# Patient Record
Sex: FEMALE | Race: WHITE | Employment: FULL TIME | URBAN - METROPOLITAN AREA
[De-identification: names, ages, dates, MRNs, and addresses within clinical notes are randomized per-mention and may not be internally consistent; named-entity substitution may affect disease eponyms.]

---

## 2018-05-19 ENCOUNTER — HOSPITAL ENCOUNTER (OUTPATIENT)
Age: 63
Discharge: HOME | End: 2018-05-19
Payer: COMMERCIAL

## 2018-05-19 LAB
ALBUMIN SERPL-MCNC: 4.1 G/DL (ref 3.5–5.2)
ALBUMIN/GLOBULIN RATIO: NORMAL (ref 1–2.5)
ALP BLD-CCNC: 70 U/L (ref 35–104)
ALT SERPL-CCNC: 17 U/L (ref 5–33)
AST SERPL-CCNC: 20 U/L
BILIRUB SERPL-MCNC: 0.56 MG/DL (ref 0.3–1.2)
BILIRUBIN DIRECT: 0.13 MG/DL
BILIRUBIN, INDIRECT: 0.43 MG/DL (ref 0–1)
GLOBULIN: NORMAL G/DL (ref 1.5–3.8)
HCT VFR BLD CALC: 39.1 % (ref 36–46)
HEMOGLOBIN: 12.8 G/DL (ref 12–16)
MCH RBC QN AUTO: 30 PG (ref 26–34)
MCHC RBC AUTO-ENTMCNC: 32.7 G/DL (ref 31–37)
MCV RBC AUTO: 91.7 FL (ref 80–100)
NRBC AUTOMATED: NORMAL PER 100 WBC
PDW BLD-RTO: 13.1 % (ref 11.5–14.5)
PLATELET # BLD: 251 K/UL (ref 130–400)
PMV BLD AUTO: 8.5 FL (ref 6–12)
RBC # BLD: 4.26 M/UL (ref 4–5.2)
TOTAL PROTEIN: 6.9 G/DL (ref 6.4–8.3)
WBC # BLD: 6.5 K/UL (ref 3.5–11)

## 2018-05-19 PROCEDURE — 86480 TB TEST CELL IMMUN MEASURE: CPT

## 2018-05-19 PROCEDURE — 36415 COLL VENOUS BLD VENIPUNCTURE: CPT

## 2018-05-19 PROCEDURE — 80076 HEPATIC FUNCTION PANEL: CPT

## 2018-05-19 PROCEDURE — 85027 COMPLETE CBC AUTOMATED: CPT

## 2018-05-21 LAB
QUANTIFERON (R) TB GOLD (INCUBATED): NEGATIVE
QUANTIFERON MITOGEN: >10 IU/ML
QUANTIFERON NIL: 0.03 IU/ML
QUANTIFERON TB AG MINUS NIL: 0 IU/ML (ref 0–0.34)

## 2019-12-05 ENCOUNTER — HOSPITAL ENCOUNTER (OUTPATIENT)
Dept: GENERAL RADIOLOGY | Age: 64
Discharge: HOME | End: 2019-12-07
Payer: MEDICARE

## 2019-12-05 ENCOUNTER — HOSPITAL ENCOUNTER (OUTPATIENT)
Age: 64
Discharge: HOME | End: 2019-12-07
Payer: MEDICARE

## 2019-12-05 ENCOUNTER — HOSPITAL ENCOUNTER (OUTPATIENT)
Age: 64
Discharge: HOME | End: 2019-12-05
Payer: MEDICARE

## 2019-12-05 DIAGNOSIS — M25.532 ACUTE PAIN OF LEFT WRIST: ICD-10-CM

## 2019-12-05 LAB
ABSOLUTE EOS #: 0.79 K/UL (ref 0–0.44)
ABSOLUTE IMMATURE GRANULOCYTE: <0.03 K/UL (ref 0–0.3)
ABSOLUTE LYMPH #: 2.91 K/UL (ref 1.1–3.7)
ABSOLUTE MONO #: 0.8 K/UL (ref 0.1–1.2)
ALBUMIN SERPL-MCNC: 4 G/DL (ref 3.5–5.2)
ALBUMIN SERPL-MCNC: 4.1 G/DL (ref 3.5–5.2)
ALBUMIN/GLOBULIN RATIO: 1.3 (ref 1–2.5)
ALBUMIN/GLOBULIN RATIO: 1.3 (ref 1–2.5)
ALP BLD-CCNC: 77 U/L (ref 35–104)
ALP BLD-CCNC: 78 U/L (ref 35–104)
ALT SERPL-CCNC: 17 U/L (ref 5–33)
ALT SERPL-CCNC: 17 U/L (ref 5–33)
ANION GAP SERPL CALCULATED.3IONS-SCNC: 13 MMOL/L (ref 9–17)
ANION GAP SERPL CALCULATED.3IONS-SCNC: 16 MMOL/L (ref 9–17)
AST SERPL-CCNC: 19 U/L
AST SERPL-CCNC: 20 U/L
BASOPHILS # BLD: 1 % (ref 0–2)
BASOPHILS ABSOLUTE: 0.11 K/UL (ref 0–0.2)
BILIRUB SERPL-MCNC: 0.29 MG/DL (ref 0.3–1.2)
BILIRUB SERPL-MCNC: 0.3 MG/DL (ref 0.3–1.2)
BUN BLDV-MCNC: 20 MG/DL (ref 8–23)
BUN BLDV-MCNC: 21 MG/DL (ref 8–23)
BUN/CREAT BLD: ABNORMAL (ref 9–20)
BUN/CREAT BLD: ABNORMAL (ref 9–20)
CALCIUM SERPL-MCNC: 9 MG/DL (ref 8.6–10.4)
CALCIUM SERPL-MCNC: 9.1 MG/DL (ref 8.6–10.4)
CHLORIDE BLD-SCNC: 105 MMOL/L (ref 98–107)
CHLORIDE BLD-SCNC: 107 MMOL/L (ref 98–107)
CHOLESTEROL, FASTING: 196 MG/DL
CHOLESTEROL/HDL RATIO: 2.8
CO2: 21 MMOL/L (ref 20–31)
CO2: 23 MMOL/L (ref 20–31)
CREAT SERPL-MCNC: 0.58 MG/DL (ref 0.5–0.9)
CREAT SERPL-MCNC: 0.63 MG/DL (ref 0.5–0.9)
DIFFERENTIAL TYPE: ABNORMAL
EOSINOPHILS RELATIVE PERCENT: 9 % (ref 1–4)
GFR AFRICAN AMERICAN: >60 ML/MIN
GFR AFRICAN AMERICAN: >60 ML/MIN
GFR NON-AFRICAN AMERICAN: >60 ML/MIN
GFR NON-AFRICAN AMERICAN: >60 ML/MIN
GFR SERPL CREATININE-BSD FRML MDRD: ABNORMAL ML/MIN/{1.73_M2}
GLUCOSE BLD-MCNC: 115 MG/DL (ref 70–99)
GLUCOSE BLD-MCNC: 116 MG/DL (ref 70–99)
HCT VFR BLD CALC: 39.3 % (ref 36.3–47.1)
HDLC SERPL-MCNC: 71 MG/DL
HEMOGLOBIN: 12.6 G/DL (ref 11.9–15.1)
IMMATURE GRANULOCYTES: 0 %
LDL CHOLESTEROL: 109 MG/DL (ref 0–130)
LYMPHOCYTES # BLD: 35 % (ref 24–43)
MCH RBC QN AUTO: 30.1 PG (ref 25.2–33.5)
MCHC RBC AUTO-ENTMCNC: 32.1 G/DL (ref 28.4–34.8)
MCV RBC AUTO: 93.8 FL (ref 82.6–102.9)
MONOCYTES # BLD: 10 % (ref 3–12)
NRBC AUTOMATED: 0 PER 100 WBC
PDW BLD-RTO: 12.3 % (ref 11.8–14.4)
PLATELET # BLD: 280 K/UL (ref 138–453)
PLATELET ESTIMATE: ABNORMAL
PMV BLD AUTO: 11.1 FL (ref 8.1–13.5)
POTASSIUM SERPL-SCNC: 3.9 MMOL/L (ref 3.7–5.3)
POTASSIUM SERPL-SCNC: 4 MMOL/L (ref 3.7–5.3)
RBC # BLD: 4.19 M/UL (ref 3.95–5.11)
RBC # BLD: ABNORMAL 10*6/UL
SEG NEUTROPHILS: 45 % (ref 36–65)
SEGMENTED NEUTROPHILS ABSOLUTE COUNT: 3.8 K/UL (ref 1.5–8.1)
SODIUM BLD-SCNC: 141 MMOL/L (ref 135–144)
SODIUM BLD-SCNC: 144 MMOL/L (ref 135–144)
TOTAL PROTEIN: 7.2 G/DL (ref 6.4–8.3)
TOTAL PROTEIN: 7.2 G/DL (ref 6.4–8.3)
TRIGLYCERIDE, FASTING: 80 MG/DL
VLDLC SERPL CALC-MCNC: NORMAL MG/DL (ref 1–30)
WBC # BLD: 8.4 K/UL (ref 3.5–11.3)
WBC # BLD: ABNORMAL 10*3/UL

## 2019-12-05 PROCEDURE — 85025 COMPLETE CBC W/AUTO DIFF WBC: CPT

## 2019-12-05 PROCEDURE — 80061 LIPID PANEL: CPT

## 2019-12-05 PROCEDURE — 36415 COLL VENOUS BLD VENIPUNCTURE: CPT

## 2019-12-05 PROCEDURE — 86480 TB TEST CELL IMMUN MEASURE: CPT

## 2019-12-05 PROCEDURE — 80053 COMPREHEN METABOLIC PANEL: CPT

## 2019-12-05 PROCEDURE — 73110 X-RAY EXAM OF WRIST: CPT

## 2019-12-08 LAB
QUANTI TB GOLD PLUS: NEGATIVE
QUANTI TB1 MINUS NIL: 0.01 IU/ML (ref 0–0.34)
QUANTI TB2 MINUS NIL: 0.02 IU/ML (ref 0–0.34)
QUANTIFERON MITOGEN: 7.02 IU/ML
QUANTIFERON NIL: 0.02 IU/ML

## 2020-11-30 ENCOUNTER — APPOINTMENT (OUTPATIENT)
Dept: CT IMAGING | Age: 65
End: 2020-11-30
Payer: MEDICARE

## 2020-11-30 ENCOUNTER — HOSPITAL ENCOUNTER (EMERGENCY)
Age: 65
Discharge: HOME | End: 2020-11-30
Attending: EMERGENCY MEDICINE
Payer: MEDICARE

## 2020-11-30 VITALS
RESPIRATION RATE: 16 BRPM | DIASTOLIC BLOOD PRESSURE: 69 MMHG | BODY MASS INDEX: 29.31 KG/M2 | WEIGHT: 175.9 LBS | TEMPERATURE: 98.2 F | HEART RATE: 77 BPM | OXYGEN SATURATION: 99 % | SYSTOLIC BLOOD PRESSURE: 144 MMHG | HEIGHT: 65 IN

## 2020-11-30 LAB
ABSOLUTE EOS #: 0.32 K/UL (ref 0–0.44)
ABSOLUTE IMMATURE GRANULOCYTE: 0.03 K/UL (ref 0–0.3)
ABSOLUTE LYMPH #: 2.33 K/UL (ref 1.1–3.7)
ABSOLUTE MONO #: 0.94 K/UL (ref 0.1–1.2)
ALBUMIN SERPL-MCNC: 4.6 G/DL (ref 3.5–5.2)
ALBUMIN/GLOBULIN RATIO: ABNORMAL (ref 1–2.5)
ALP BLD-CCNC: 128 U/L (ref 35–104)
ALT SERPL-CCNC: 29 U/L (ref 5–33)
AMYLASE: 58 U/L (ref 28–100)
ANION GAP SERPL CALCULATED.3IONS-SCNC: 13 MMOL/L (ref 9–17)
AST SERPL-CCNC: 25 U/L
BASOPHILS # BLD: 1 % (ref 0–2)
BASOPHILS ABSOLUTE: 0.07 K/UL (ref 0–0.2)
BILIRUB SERPL-MCNC: 0.36 MG/DL (ref 0.3–1.2)
BILIRUBIN DIRECT: 0.13 MG/DL
BILIRUBIN, INDIRECT: 0.23 MG/DL (ref 0–1)
BUN BLDV-MCNC: 10 MG/DL (ref 8–23)
BUN/CREAT BLD: 13 (ref 9–20)
CALCIUM SERPL-MCNC: 9.4 MG/DL (ref 8.6–10.4)
CHLORIDE BLD-SCNC: 96 MMOL/L (ref 98–107)
CO2: 24 MMOL/L (ref 20–31)
CREAT SERPL-MCNC: 0.78 MG/DL (ref 0.5–0.9)
DIFFERENTIAL TYPE: NORMAL
EOSINOPHILS RELATIVE PERCENT: 4 % (ref 1–4)
GFR AFRICAN AMERICAN: >60 ML/MIN
GFR NON-AFRICAN AMERICAN: >60 ML/MIN
GFR SERPL CREATININE-BSD FRML MDRD: ABNORMAL ML/MIN/{1.73_M2}
GFR SERPL CREATININE-BSD FRML MDRD: ABNORMAL ML/MIN/{1.73_M2}
GLOBULIN: ABNORMAL G/DL (ref 1.5–3.8)
GLUCOSE BLD-MCNC: 101 MG/DL (ref 70–99)
HCT VFR BLD CALC: 40 % (ref 36.3–47.1)
HEMOGLOBIN: 12.8 G/DL (ref 11.9–15.1)
IMMATURE GRANULOCYTES: 0 %
LIPASE: 32 U/L (ref 13–60)
LYMPHOCYTES # BLD: 30 % (ref 24–43)
MCH RBC QN AUTO: 29.8 PG (ref 25.2–33.5)
MCHC RBC AUTO-ENTMCNC: 32 G/DL (ref 28.4–34.8)
MCV RBC AUTO: 93.2 FL (ref 82.6–102.9)
MONOCYTES # BLD: 12 % (ref 3–12)
NRBC AUTOMATED: 0 PER 100 WBC
PDW BLD-RTO: 12 % (ref 11.8–14.4)
PLATELET # BLD: 351 K/UL (ref 138–453)
PLATELET ESTIMATE: NORMAL
PMV BLD AUTO: 9.6 FL (ref 8.1–13.5)
POTASSIUM SERPL-SCNC: 4 MMOL/L (ref 3.7–5.3)
RBC # BLD: 4.29 M/UL (ref 3.95–5.11)
RBC # BLD: NORMAL 10*6/UL
SEG NEUTROPHILS: 53 % (ref 36–65)
SEGMENTED NEUTROPHILS ABSOLUTE COUNT: 4.06 K/UL (ref 1.5–8.1)
SODIUM BLD-SCNC: 133 MMOL/L (ref 135–144)
TOTAL PROTEIN: 8.1 G/DL (ref 6.4–8.3)
WBC # BLD: 7.8 K/UL (ref 3.5–11.3)
WBC # BLD: NORMAL 10*3/UL

## 2020-11-30 PROCEDURE — 83690 ASSAY OF LIPASE: CPT

## 2020-11-30 PROCEDURE — 80076 HEPATIC FUNCTION PANEL: CPT

## 2020-11-30 PROCEDURE — 6360000004 HC RX CONTRAST MEDICATION: Performed by: EMERGENCY MEDICINE

## 2020-11-30 PROCEDURE — 85025 COMPLETE CBC W/AUTO DIFF WBC: CPT

## 2020-11-30 PROCEDURE — 74177 CT ABD & PELVIS W/CONTRAST: CPT

## 2020-11-30 PROCEDURE — 80048 BASIC METABOLIC PNL TOTAL CA: CPT

## 2020-11-30 PROCEDURE — 82150 ASSAY OF AMYLASE: CPT

## 2020-11-30 PROCEDURE — 99282 EMERGENCY DEPT VISIT SF MDM: CPT

## 2020-11-30 PROCEDURE — 2580000003 HC RX 258: Performed by: EMERGENCY MEDICINE

## 2020-11-30 RX ORDER — LISINOPRIL 20 MG/1
20 TABLET ORAL DAILY
COMMUNITY

## 2020-11-30 RX ORDER — HYDROCODONE BITARTRATE AND ACETAMINOPHEN 5; 325 MG/1; MG/1
1 TABLET ORAL EVERY 4 HOURS PRN
Qty: 10 TABLET | Refills: 0 | Status: SHIPPED | OUTPATIENT
Start: 2020-11-30 | End: 2020-12-05

## 2020-11-30 RX ORDER — 0.9 % SODIUM CHLORIDE 0.9 %
80 INTRAVENOUS SOLUTION INTRAVENOUS ONCE
Status: COMPLETED | OUTPATIENT
Start: 2020-11-30 | End: 2020-11-30

## 2020-11-30 RX ORDER — SULFAMETHOXAZOLE AND TRIMETHOPRIM 800; 160 MG/1; MG/1
TABLET ORAL
COMMUNITY
End: 2021-02-09 | Stop reason: ALTCHOICE

## 2020-11-30 RX ORDER — ONDANSETRON 4 MG/1
4 TABLET, ORALLY DISINTEGRATING ORAL EVERY 8 HOURS PRN
Qty: 12 TABLET | Refills: 0 | Status: ON HOLD | OUTPATIENT
Start: 2020-11-30 | End: 2021-03-02

## 2020-11-30 RX ORDER — SODIUM CHLORIDE 0.9 % (FLUSH) 0.9 %
10 SYRINGE (ML) INJECTION ONCE
Status: COMPLETED | OUTPATIENT
Start: 2020-11-30 | End: 2020-11-30

## 2020-11-30 RX ADMIN — SODIUM CHLORIDE 80 ML: 9 INJECTION, SOLUTION INTRAVENOUS at 19:51

## 2020-11-30 RX ADMIN — Medication 10 ML: at 19:51

## 2020-11-30 RX ADMIN — IOPAMIDOL 75 ML: 755 INJECTION, SOLUTION INTRAVENOUS at 19:51

## 2020-11-30 SDOH — HEALTH STABILITY: MENTAL HEALTH: HOW OFTEN DO YOU HAVE A DRINK CONTAINING ALCOHOL?: NEVER

## 2020-11-30 ASSESSMENT — ENCOUNTER SYMPTOMS
NAUSEA: 1
ABDOMINAL PAIN: 1

## 2020-11-30 ASSESSMENT — PAIN SCALES - GENERAL: PAINLEVEL_OUTOF10: 3

## 2020-12-01 NOTE — ED NOTES
Assessment complaining of generalized abdominal pain that started over a week ago. It has gotten better but now states it is tender. Denies nausea vomiting or any changes in stool patterns. Abdomen softly obese tender to palpation she is in no acute distress.      Obdulio Rios RN  11/30/20 2228

## 2020-12-01 NOTE — ED NOTES
[facility] called, Dr. Michaela Johnston states final report pt would need outpatient follow up with pelvic ultrasound for cystic changes in uterus. Dr. Francisca Yen d/c pt prior to [facility] calling with an edit with the report. Report written, and will be followed up per protocol.      Huy Ang RN  11/30/20 9601

## 2020-12-10 ENCOUNTER — HOSPITAL ENCOUNTER (OUTPATIENT)
Dept: ULTRASOUND IMAGING | Age: 65
Discharge: HOME | End: 2020-12-12
Payer: MEDICARE

## 2021-02-09 ENCOUNTER — HOSPITAL ENCOUNTER (OUTPATIENT)
Dept: PREADMISSION TESTING | Age: 66
Discharge: HOME | End: 2021-02-13
Payer: MEDICARE

## 2021-02-09 VITALS
SYSTOLIC BLOOD PRESSURE: 168 MMHG | HEART RATE: 69 BPM | TEMPERATURE: 96.5 F | RESPIRATION RATE: 14 BRPM | DIASTOLIC BLOOD PRESSURE: 86 MMHG | OXYGEN SATURATION: 99 % | BODY MASS INDEX: 28.95 KG/M2 | HEIGHT: 64 IN | WEIGHT: 169.6 LBS

## 2021-02-09 LAB
ANION GAP SERPL CALCULATED.3IONS-SCNC: 9 MMOL/L (ref 9–17)
BUN BLDV-MCNC: 15 MG/DL (ref 8–23)
CHLORIDE BLD-SCNC: 102 MMOL/L (ref 98–107)
CO2: 27 MMOL/L (ref 20–31)
CREAT SERPL-MCNC: 0.61 MG/DL (ref 0.5–0.9)
GFR AFRICAN AMERICAN: >60 ML/MIN
GFR NON-AFRICAN AMERICAN: >60 ML/MIN
GFR SERPL CREATININE-BSD FRML MDRD: NORMAL ML/MIN/{1.73_M2}
GFR SERPL CREATININE-BSD FRML MDRD: NORMAL ML/MIN/{1.73_M2}
HCT VFR BLD CALC: 42 % (ref 36.3–47.1)
HEMOGLOBIN: 13.5 G/DL (ref 11.9–15.1)
MCH RBC QN AUTO: 29.5 PG (ref 25.2–33.5)
MCHC RBC AUTO-ENTMCNC: 32.1 G/DL (ref 28.4–34.8)
MCV RBC AUTO: 91.9 FL (ref 82.6–102.9)
NRBC AUTOMATED: 0 PER 100 WBC
PDW BLD-RTO: 12.7 % (ref 11.8–14.4)
PLATELET # BLD: 274 K/UL (ref 138–453)
PMV BLD AUTO: 10 FL (ref 8.1–13.5)
POTASSIUM SERPL-SCNC: 4.2 MMOL/L (ref 3.7–5.3)
RBC # BLD: 4.57 M/UL (ref 3.95–5.11)
SODIUM BLD-SCNC: 138 MMOL/L (ref 135–144)
WBC # BLD: 8.2 K/UL (ref 3.5–11.3)

## 2021-02-09 PROCEDURE — 85027 COMPLETE CBC AUTOMATED: CPT

## 2021-02-09 PROCEDURE — 84520 ASSAY OF UREA NITROGEN: CPT

## 2021-02-09 PROCEDURE — 93005 ELECTROCARDIOGRAM TRACING: CPT | Performed by: ANESTHESIOLOGY

## 2021-02-09 PROCEDURE — 80051 ELECTROLYTE PANEL: CPT

## 2021-02-09 PROCEDURE — 82565 ASSAY OF CREATININE: CPT

## 2021-02-09 PROCEDURE — 36415 COLL VENOUS BLD VENIPUNCTURE: CPT

## 2021-02-09 RX ORDER — ACETAMINOPHEN 500 MG
500 TABLET ORAL PRN
COMMUNITY

## 2021-02-09 RX ORDER — OMEPRAZOLE 20 MG/1
20 CAPSULE, DELAYED RELEASE ORAL DAILY
COMMUNITY

## 2021-02-09 NOTE — PRE-PROCEDURE INSTRUCTIONS
ARRIVE AT [facility] ON Tuesday, 3/2/21 at 8:15 AM    On the Day of Surgery: Drive up to the main entrance and remain in your vehicle. Call [phone] from your vehicle for additional instructions. No family members or other visitors will be able to enter the building with you. Continue to take your home medications as you normally do up to and including the night before surgery with the exception of any blood thinning medications. Please stop any blood thinning medications as directed by your surgeon or prescribing physician. Failure to stop certain medications may interfere with your scheduled surgery. These may include:  Aspirin, Warfarin (Coumadin), Clopidogrel (Plavix), Ibuprofen (Motrin, Advil), Naproxen (Aleve), Meloxicam (Mobic), Celecoxib (Celebrex), Eliquis, Pradaxa, Xarelto, Effient, Fish Oil, Herbal supplements. Please take the following medication(s) the day of surgery with a small sip of water:  Omeprazole      PREPARING FOR YOUR SURGERY:     Before surgery, you can play an important role in your own health. Because skin is not sterile, we need to be sure that your skin is as free of germs as possible before surgery by carefully washing before surgery. Preparing or prepping skin before surgery can reduce the risk of a surgical site infection.   Do not shave the area of your body where your surgery will be performed unless you received specific permission from your physician. You will need to shower at home the night before surgery and the morning of surgery with a special soap called chlorhexidine gluconate (CHG*). *Not to be used by people allergic to Chlorhexidine Gluconate (CHG). Following these instructions will help you be sure that your skin is clean before surgery. Instructions on cleaning your skin before surgery: The night before your surgery:      You will need to shower with warm water (not hot) and the CHG soap.        Use a clean wash cloth and a any jewelry or body piercings day of surgery. Also, NO lotion, perfume or deodorant to be used the day of surgery. No nail polish on the operative extremity (arm/leg surgeries)    · If you are staying overnight with us, please bring a small bag of necessary personal items.  Please wear loose, comfortable clothing. If you are potentially going to have a cast or brace bring clothing that will fit over them.  In case of illness - If you have cold or flu like symptoms (high fever, runny nose, sore throat, cough, etc.) rash, nausea, vomiting, loose stools, and/or recent contact with someone who has a contagious disease (chicken pox, measles, etc.) Please call your doctor before coming to the hospital.         Day of Surgery/Procedure:    As a patient at [facility] you can expect quality medical and nursing care that is centered on your individual needs. Our goal is to make your surgical experience as comfortable as possible    . Transportation After Your Surgery/Procedure: You will need a friend or family member to drive you home after your procedure. Your  must be 25years of age or older. Discharge instructions will be reviewed with family/friend by phone. A taxi cab or any other form of public transportation is not acceptable. Someone must remain with you for the first 24 hours after your surgery if you receive anesthesia or medication. If you do not have someone to stay with you, your procedure may be cancelled.       If you have any other questions regarding your procedure or the day of surgery, please call [phone]    COVID test on 2/26/21 @ 2:20 pm      _________________________  ____________________________  Signature (Patient)    Signature (Provider)            Date

## 2021-02-10 LAB
EKG ATRIAL RATE: 66 BPM
EKG P AXIS: 17 DEGREES
EKG P-R INTERVAL: 182 MS
EKG Q-T INTERVAL: 398 MS
EKG QRS DURATION: 86 MS
EKG QTC CALCULATION (BAZETT): 417 MS
EKG R AXIS: -7 DEGREES
EKG T AXIS: 32 DEGREES
EKG VENTRICULAR RATE: 66 BPM

## 2021-02-20 ENCOUNTER — HOSPITAL ENCOUNTER (OUTPATIENT)
Age: 66
Discharge: HOME | End: 2021-02-20
Payer: MEDICARE

## 2021-02-20 LAB
ABSOLUTE EOS #: 0.6 K/UL (ref 0–0.44)
ABSOLUTE IMMATURE GRANULOCYTE: <0.03 K/UL (ref 0–0.3)
ABSOLUTE LYMPH #: 2.22 K/UL (ref 1.1–3.7)
ABSOLUTE MONO #: 0.66 K/UL (ref 0.1–1.2)
ALBUMIN SERPL-MCNC: 4.3 G/DL (ref 3.5–5.2)
ALBUMIN/GLOBULIN RATIO: 1.3 (ref 1–2.5)
ALP BLD-CCNC: 82 U/L (ref 35–104)
ALT SERPL-CCNC: 16 U/L (ref 5–33)
ANION GAP SERPL CALCULATED.3IONS-SCNC: 10 MMOL/L (ref 9–17)
AST SERPL-CCNC: 19 U/L
BASOPHILS # BLD: 2 % (ref 0–2)
BASOPHILS ABSOLUTE: 0.15 K/UL (ref 0–0.2)
BILIRUB SERPL-MCNC: 0.79 MG/DL (ref 0.3–1.2)
BUN BLDV-MCNC: 13 MG/DL (ref 8–23)
BUN/CREAT BLD: ABNORMAL (ref 9–20)
CALCIUM SERPL-MCNC: 9.5 MG/DL (ref 8.6–10.4)
CHLORIDE BLD-SCNC: 103 MMOL/L (ref 98–107)
CO2: 25 MMOL/L (ref 20–31)
CREAT SERPL-MCNC: 0.7 MG/DL (ref 0.5–0.9)
DIFFERENTIAL TYPE: ABNORMAL
EOSINOPHILS RELATIVE PERCENT: 9 % (ref 1–4)
GFR AFRICAN AMERICAN: >60 ML/MIN
GFR NON-AFRICAN AMERICAN: >60 ML/MIN
GFR SERPL CREATININE-BSD FRML MDRD: ABNORMAL ML/MIN/{1.73_M2}
GFR SERPL CREATININE-BSD FRML MDRD: ABNORMAL ML/MIN/{1.73_M2}
GLUCOSE BLD-MCNC: 107 MG/DL (ref 70–99)
HCT VFR BLD CALC: 42.2 % (ref 36.3–47.1)
HEMOGLOBIN: 13.3 G/DL (ref 11.9–15.1)
IMMATURE GRANULOCYTES: 0 %
LYMPHOCYTES # BLD: 34 % (ref 24–43)
MCH RBC QN AUTO: 29.4 PG (ref 25.2–33.5)
MCHC RBC AUTO-ENTMCNC: 31.5 G/DL (ref 28.4–34.8)
MCV RBC AUTO: 93.2 FL (ref 82.6–102.9)
MONOCYTES # BLD: 10 % (ref 3–12)
NRBC AUTOMATED: 0 PER 100 WBC
PDW BLD-RTO: 12.7 % (ref 11.8–14.4)
PLATELET # BLD: 284 K/UL (ref 138–453)
PLATELET ESTIMATE: ABNORMAL
PMV BLD AUTO: 11.3 FL (ref 8.1–13.5)
POTASSIUM SERPL-SCNC: 4.2 MMOL/L (ref 3.7–5.3)
PTH INTACT: 62.97 PG/ML (ref 15–65)
RBC # BLD: 4.53 M/UL (ref 3.95–5.11)
RBC # BLD: ABNORMAL 10*6/UL
SEG NEUTROPHILS: 45 % (ref 36–65)
SEGMENTED NEUTROPHILS ABSOLUTE COUNT: 2.89 K/UL (ref 1.5–8.1)
SODIUM BLD-SCNC: 138 MMOL/L (ref 135–144)
THYROXINE, FREE: 0.86 NG/DL (ref 0.93–1.7)
TOTAL PROTEIN: 7.6 G/DL (ref 6.4–8.3)
TSH SERPL DL<=0.05 MIU/L-ACNC: 2.33 MIU/L (ref 0.3–5)
VITAMIN D 25-HYDROXY: 20 NG/ML (ref 30–100)
WBC # BLD: 6.5 K/UL (ref 3.5–11.3)
WBC # BLD: ABNORMAL 10*3/UL

## 2021-02-20 PROCEDURE — 84443 ASSAY THYROID STIM HORMONE: CPT

## 2021-02-20 PROCEDURE — 83970 ASSAY OF PARATHORMONE: CPT

## 2021-02-20 PROCEDURE — 36415 COLL VENOUS BLD VENIPUNCTURE: CPT

## 2021-02-20 PROCEDURE — 82306 VITAMIN D 25 HYDROXY: CPT

## 2021-02-20 PROCEDURE — 84439 ASSAY OF FREE THYROXINE: CPT

## 2021-02-20 PROCEDURE — 85025 COMPLETE CBC W/AUTO DIFF WBC: CPT

## 2021-02-20 PROCEDURE — 80053 COMPREHEN METABOLIC PANEL: CPT

## 2021-02-26 ENCOUNTER — HOSPITAL ENCOUNTER (OUTPATIENT)
Dept: LAB | Age: 66
Setting detail: SPECIMEN
Discharge: HOME | End: 2021-02-26
Payer: MEDICARE

## 2021-02-26 DIAGNOSIS — Z01.818 PREOP TESTING: Primary | ICD-10-CM

## 2021-02-26 PROCEDURE — U0003 INFECTIOUS AGENT DETECTION BY NUCLEIC ACID (DNA OR RNA); SEVERE ACUTE RESPIRATORY SYNDROME CORONAVIRUS 2 (SARS-COV-2) (CORONAVIRUS DISEASE [COVID-19]), AMPLIFIED PROBE TECHNIQUE, MAKING USE OF HIGH THROUGHPUT TECHNOLOGIES AS DESCRIBED BY CMS-2020-01-R: HCPCS

## 2021-02-26 PROCEDURE — U0005 INFEC AGEN DETEC AMPLI PROBE: HCPCS

## 2021-02-27 LAB
SARS-COV-2: NORMAL
SARS-COV-2: NOT DETECTED
SOURCE: NORMAL

## 2021-03-02 ENCOUNTER — HOSPITAL ENCOUNTER (OUTPATIENT)
Age: 66
Setting detail: OUTPATIENT SURGERY
Discharge: HOME | End: 2021-03-02
Attending: SURGERY | Admitting: SURGERY
Payer: MEDICARE

## 2021-03-02 ENCOUNTER — ANESTHESIA (OUTPATIENT)
Dept: OPERATING ROOM | Age: 66
End: 2021-03-02
Payer: MEDICARE

## 2021-03-02 ENCOUNTER — ANESTHESIA EVENT (OUTPATIENT)
Dept: OPERATING ROOM | Age: 66
End: 2021-03-02
Payer: MEDICARE

## 2021-03-02 VITALS
SYSTOLIC BLOOD PRESSURE: 159 MMHG | RESPIRATION RATE: 18 BRPM | TEMPERATURE: 98.2 F | HEIGHT: 64 IN | WEIGHT: 169.53 LBS | HEART RATE: 79 BPM | OXYGEN SATURATION: 94 % | DIASTOLIC BLOOD PRESSURE: 85 MMHG | BODY MASS INDEX: 28.94 KG/M2

## 2021-03-02 VITALS — TEMPERATURE: 83.7 F | DIASTOLIC BLOOD PRESSURE: 67 MMHG | SYSTOLIC BLOOD PRESSURE: 144 MMHG | OXYGEN SATURATION: 100 %

## 2021-03-02 DIAGNOSIS — Z87.19 HISTORY OF INCISIONAL HERNIA REPAIR: Primary | ICD-10-CM

## 2021-03-02 DIAGNOSIS — Z98.890 HISTORY OF INCISIONAL HERNIA REPAIR: Primary | ICD-10-CM

## 2021-03-02 PROCEDURE — 3600000009 HC SURGERY ROBOT BASE: Performed by: SURGERY

## 2021-03-02 PROCEDURE — 3600000019 HC SURGERY ROBOT ADDTL 15MIN: Performed by: SURGERY

## 2021-03-02 PROCEDURE — 7100000010 HC PHASE II RECOVERY - FIRST 15 MIN: Performed by: SURGERY

## 2021-03-02 PROCEDURE — 6360000002 HC RX W HCPCS: Performed by: NURSE ANESTHETIST, CERTIFIED REGISTERED

## 2021-03-02 PROCEDURE — 2720000010 HC SURG SUPPLY STERILE: Performed by: SURGERY

## 2021-03-02 PROCEDURE — 7100000001 HC PACU RECOVERY - ADDTL 15 MIN: Performed by: SURGERY

## 2021-03-02 PROCEDURE — 6360000002 HC RX W HCPCS: Performed by: ANESTHESIOLOGY

## 2021-03-02 PROCEDURE — 6360000002 HC RX W HCPCS: Performed by: SURGERY

## 2021-03-02 PROCEDURE — 7100000000 HC PACU RECOVERY - FIRST 15 MIN: Performed by: SURGERY

## 2021-03-02 PROCEDURE — 7100000011 HC PHASE II RECOVERY - ADDTL 15 MIN: Performed by: SURGERY

## 2021-03-02 PROCEDURE — 2580000003 HC RX 258: Performed by: ANESTHESIOLOGY

## 2021-03-02 PROCEDURE — 2500000003 HC RX 250 WO HCPCS: Performed by: NURSE ANESTHETIST, CERTIFIED REGISTERED

## 2021-03-02 PROCEDURE — 2500000003 HC RX 250 WO HCPCS: Performed by: SURGERY

## 2021-03-02 PROCEDURE — C1781 MESH (IMPLANTABLE): HCPCS | Performed by: SURGERY

## 2021-03-02 PROCEDURE — 3700000000 HC ANESTHESIA ATTENDED CARE: Performed by: SURGERY

## 2021-03-02 PROCEDURE — S2900 ROBOTIC SURGICAL SYSTEM: HCPCS | Performed by: SURGERY

## 2021-03-02 PROCEDURE — 3700000001 HC ADD 15 MINUTES (ANESTHESIA): Performed by: SURGERY

## 2021-03-02 PROCEDURE — 2709999900 HC NON-CHARGEABLE SUPPLY: Performed by: SURGERY

## 2021-03-02 DEVICE — MESH SURG W3.5XL6IN POLY SELF FIXATING RECT W/ RESRB PLA: Type: IMPLANTABLE DEVICE | Site: GROIN | Status: FUNCTIONAL

## 2021-03-02 RX ORDER — FENTANYL CITRATE 50 UG/ML
25 INJECTION, SOLUTION INTRAMUSCULAR; INTRAVENOUS EVERY 5 MIN PRN
Status: DISCONTINUED | OUTPATIENT
Start: 2021-03-02 | End: 2021-03-02 | Stop reason: HOSPADM

## 2021-03-02 RX ORDER — DEXAMETHASONE SODIUM PHOSPHATE 10 MG/ML
INJECTION INTRAMUSCULAR; INTRAVENOUS PRN
Status: DISCONTINUED | OUTPATIENT
Start: 2021-03-02 | End: 2021-03-02 | Stop reason: SDUPTHER

## 2021-03-02 RX ORDER — LIDOCAINE HYDROCHLORIDE 20 MG/ML
INJECTION, SOLUTION INFILTRATION; PERINEURAL PRN
Status: DISCONTINUED | OUTPATIENT
Start: 2021-03-02 | End: 2021-03-02 | Stop reason: SDUPTHER

## 2021-03-02 RX ORDER — ONDANSETRON 2 MG/ML
4 INJECTION INTRAMUSCULAR; INTRAVENOUS
Status: DISCONTINUED | OUTPATIENT
Start: 2021-03-02 | End: 2021-03-02 | Stop reason: HOSPADM

## 2021-03-02 RX ORDER — ROCURONIUM BROMIDE 10 MG/ML
INJECTION, SOLUTION INTRAVENOUS PRN
Status: DISCONTINUED | OUTPATIENT
Start: 2021-03-02 | End: 2021-03-02 | Stop reason: SDUPTHER

## 2021-03-02 RX ORDER — ONDANSETRON 2 MG/ML
INJECTION INTRAMUSCULAR; INTRAVENOUS PRN
Status: DISCONTINUED | OUTPATIENT
Start: 2021-03-02 | End: 2021-03-02 | Stop reason: SDUPTHER

## 2021-03-02 RX ORDER — OXYCODONE HYDROCHLORIDE AND ACETAMINOPHEN 5; 325 MG/1; MG/1
1 TABLET ORAL EVERY 6 HOURS PRN
Qty: 28 TABLET | Refills: 0 | Status: SHIPPED | OUTPATIENT
Start: 2021-03-02 | End: 2021-03-09

## 2021-03-02 RX ORDER — DOCUSATE SODIUM 100 MG/1
100 CAPSULE, LIQUID FILLED ORAL 2 TIMES DAILY PRN
Qty: 60 CAPSULE | Refills: 0 | Status: SHIPPED | OUTPATIENT
Start: 2021-03-02 | End: 2021-04-01

## 2021-03-02 RX ORDER — HYDROMORPHONE HCL 110MG/55ML
0.25 PATIENT CONTROLLED ANALGESIA SYRINGE INTRAVENOUS EVERY 5 MIN PRN
Status: DISCONTINUED | OUTPATIENT
Start: 2021-03-02 | End: 2021-03-02 | Stop reason: HOSPADM

## 2021-03-02 RX ORDER — ONDANSETRON 4 MG/1
4 TABLET, ORALLY DISINTEGRATING ORAL EVERY 8 HOURS PRN
Qty: 20 TABLET | Refills: 0 | Status: SHIPPED | OUTPATIENT
Start: 2021-03-02

## 2021-03-02 RX ORDER — PROPOFOL 10 MG/ML
INJECTION, EMULSION INTRAVENOUS PRN
Status: DISCONTINUED | OUTPATIENT
Start: 2021-03-02 | End: 2021-03-02 | Stop reason: SDUPTHER

## 2021-03-02 RX ORDER — KETOROLAC TROMETHAMINE 30 MG/ML
INJECTION, SOLUTION INTRAMUSCULAR; INTRAVENOUS PRN
Status: DISCONTINUED | OUTPATIENT
Start: 2021-03-02 | End: 2021-03-02 | Stop reason: SDUPTHER

## 2021-03-02 RX ORDER — FENTANYL CITRATE 50 UG/ML
INJECTION, SOLUTION INTRAMUSCULAR; INTRAVENOUS PRN
Status: DISCONTINUED | OUTPATIENT
Start: 2021-03-02 | End: 2021-03-02 | Stop reason: SDUPTHER

## 2021-03-02 RX ORDER — METHOCARBAMOL 500 MG/1
500 TABLET, FILM COATED ORAL 4 TIMES DAILY
Qty: 28 TABLET | Refills: 0 | Status: SHIPPED | OUTPATIENT
Start: 2021-03-02 | End: 2021-03-09

## 2021-03-02 RX ORDER — SODIUM CHLORIDE, SODIUM LACTATE, POTASSIUM CHLORIDE, CALCIUM CHLORIDE 600; 310; 30; 20 MG/100ML; MG/100ML; MG/100ML; MG/100ML
INJECTION, SOLUTION INTRAVENOUS CONTINUOUS
Status: DISCONTINUED | OUTPATIENT
Start: 2021-03-03 | End: 2021-03-02 | Stop reason: HOSPADM

## 2021-03-02 RX ORDER — LIDOCAINE HYDROCHLORIDE 10 MG/ML
1 INJECTION, SOLUTION EPIDURAL; INFILTRATION; INTRACAUDAL; PERINEURAL
Status: DISCONTINUED | OUTPATIENT
Start: 2021-03-03 | End: 2021-03-02 | Stop reason: HOSPADM

## 2021-03-02 RX ADMIN — Medication 50 MCG: at 09:48

## 2021-03-02 RX ADMIN — Medication 25 MCG: at 10:05

## 2021-03-02 RX ADMIN — SODIUM CHLORIDE, POTASSIUM CHLORIDE, SODIUM LACTATE AND CALCIUM CHLORIDE: 600; 310; 30; 20 INJECTION, SOLUTION INTRAVENOUS at 09:21

## 2021-03-02 RX ADMIN — FENTANYL CITRATE 25 MCG: 50 INJECTION, SOLUTION INTRAMUSCULAR; INTRAVENOUS at 11:33

## 2021-03-02 RX ADMIN — SODIUM CHLORIDE, POTASSIUM CHLORIDE, SODIUM LACTATE AND CALCIUM CHLORIDE: 600; 310; 30; 20 INJECTION, SOLUTION INTRAVENOUS at 08:49

## 2021-03-02 RX ADMIN — ROCURONIUM BROMIDE 50 MG: 10 INJECTION, SOLUTION INTRAVENOUS at 09:26

## 2021-03-02 RX ADMIN — Medication 50 MCG: at 09:56

## 2021-03-02 RX ADMIN — FENTANYL CITRATE 25 MCG: 50 INJECTION, SOLUTION INTRAMUSCULAR; INTRAVENOUS at 11:10

## 2021-03-02 RX ADMIN — ONDANSETRON 4 MG: 2 INJECTION, SOLUTION INTRAMUSCULAR; INTRAVENOUS at 09:35

## 2021-03-02 RX ADMIN — CEFAZOLIN 2000 MG: 10 INJECTION, POWDER, FOR SOLUTION INTRAVENOUS at 09:33

## 2021-03-02 RX ADMIN — Medication 50 MCG: at 09:26

## 2021-03-02 RX ADMIN — KETOROLAC TROMETHAMINE 30 MG: 30 INJECTION, SOLUTION INTRAMUSCULAR; INTRAVENOUS at 10:34

## 2021-03-02 RX ADMIN — DEXAMETHASONE SODIUM PHOSPHATE 10 MG: 10 INJECTION INTRAMUSCULAR; INTRAVENOUS at 09:35

## 2021-03-02 RX ADMIN — LIDOCAINE HYDROCHLORIDE 100 MG: 20 INJECTION, SOLUTION INFILTRATION; PERINEURAL at 09:26

## 2021-03-02 RX ADMIN — SUGAMMADEX 200 MG: 100 INJECTION, SOLUTION INTRAVENOUS at 10:43

## 2021-03-02 RX ADMIN — PROPOFOL 140 MG: 10 INJECTION, EMULSION INTRAVENOUS at 09:26

## 2021-03-02 RX ADMIN — Medication 25 MCG: at 10:08

## 2021-03-02 ASSESSMENT — PULMONARY FUNCTION TESTS
PIF_VALUE: 18
PIF_VALUE: 21
PIF_VALUE: 3
PIF_VALUE: 30
PIF_VALUE: 29
PIF_VALUE: 33
PIF_VALUE: 33
PIF_VALUE: 21
PIF_VALUE: 33
PIF_VALUE: 33
PIF_VALUE: 0
PIF_VALUE: 21
PIF_VALUE: 21
PIF_VALUE: 31
PIF_VALUE: 31
PIF_VALUE: 15
PIF_VALUE: 33
PIF_VALUE: 2
PIF_VALUE: 33
PIF_VALUE: 13
PIF_VALUE: 33
PIF_VALUE: 1
PIF_VALUE: 33
PIF_VALUE: 4
PIF_VALUE: 33
PIF_VALUE: 33
PIF_VALUE: 34
PIF_VALUE: 29
PIF_VALUE: 15
PIF_VALUE: 33
PIF_VALUE: 33
PIF_VALUE: 29
PIF_VALUE: 29
PIF_VALUE: 11
PIF_VALUE: 33
PIF_VALUE: 33
PIF_VALUE: 21
PIF_VALUE: 6
PIF_VALUE: 33
PIF_VALUE: 33
PIF_VALUE: 13
PIF_VALUE: 30
PIF_VALUE: 15
PIF_VALUE: 29
PIF_VALUE: 20
PIF_VALUE: 33
PIF_VALUE: 26
PIF_VALUE: 22
PIF_VALUE: 33

## 2021-03-02 ASSESSMENT — PAIN DESCRIPTION - DESCRIPTORS
DESCRIPTORS: ACHING;SORE
DESCRIPTORS: ACHING;SORE

## 2021-03-02 ASSESSMENT — PAIN SCALES - GENERAL: PAINLEVEL_OUTOF10: 5

## 2021-03-02 NOTE — ANESTHESIA PRE PROCEDURE
Department of Anesthesiology  Preprocedure Note       Name:  Amy Snow   Age:  76 y.o.  :  1955                                          MRN:  [id number]         Date:  3/2/2021      Surgeon: Cayden Deal):  Georgette Santoyo MD    Procedure: Procedure(s):  INCISIONAL HERNIA REPAIR LAPAROSCOPIC ROBOTIC XI AND LEFT INGUINAL HERNIA REPAIR    Medications prior to admission:   Prior to Admission medications    Medication Sig Start Date End Date Taking? Authorizing Provider   oxyCODONE-acetaminophen (PERCOCET) 5-325 MG per tablet Take 1 tablet by mouth every 6 hours as needed for Pain for up to 7 days. Intended supply: 7 days.  Take lowest dose possible to manage pain 3/2/21 3/9/21 Yes Zenaida No, DO   docusate sodium (COLACE) 100 MG capsule Take 1 capsule by mouth 2 times daily as needed for Constipation 3/2/21 4/1/21 Yes Zenaida No, DO   ondansetron (ZOFRAN ODT) 4 MG disintegrating tablet Take 1 tablet by mouth every 8 hours as needed for Nausea or Vomiting 3/2/21  Yes Zenaida No, DO   methocarbamol (ROBAXIN) 500 MG tablet Take 1 tablet by mouth 4 times daily for 7 days 3/2/21 3/9/21 Yes Zenaida No, DO   omeprazole (PRILOSEC) 20 MG delayed release capsule Take 20 mg by mouth daily   Yes Historical Provider, MD GARCIA PO Take 5 mLs by mouth daily   Yes Historical Provider, MD   Adalimumab (HUMIRA PEN SC) Inject into the skin every 14 days    Yes Historical Provider, MD   lisinopril (PRINIVIL;ZESTRIL) 20 MG tablet Take 20 mg by mouth daily    Yes Historical Provider, MD   acetaminophen (TYLENOL) 500 MG tablet Take 500 mg by mouth as needed for Pain (headache)    Historical Provider, MD       Current medications:    Current Facility-Administered Medications   Medication Dose Route Frequency Provider Last Rate Last Admin    [START ON 3/3/2021] lactated ringers infusion   Intravenous Continuous Cassandra Burgess MD        [START ON 3/3/2021] lidocaine PF 1 % injection 1 mL  1 mL Intradermal Once PRN Alan Kline MD        ceFAZolin (ANCEF) 2000 mg in dextrose 5 % 50 mL IVPB  2,000 mg Intravenous Once Talat Hope MD           Allergies: Allergies   Allergen Reactions    Macrobid [Nitrofurantoin] Rash       Problem List:  There is no problem list on file for this patient. Past Medical History:        Diagnosis Date    Acid reflux     History of acute pancreatitis 2020    resolved    Hypertension     Psoriasis        Past Surgical History:        Procedure Laterality Date    BREAST SURGERY      reduction    CHOLECYSTECTOMY      COLONOSCOPY      TUBAL LIGATION      UPPER GASTROINTESTINAL ENDOSCOPY         Social History:    Social History     Tobacco Use    Smoking status: Former Smoker     Quit date:      Years since quittin.1    Smokeless tobacco: Never Used    Tobacco comment: exposed to second hand smoke   Substance Use Topics    Alcohol use: Yes     Frequency: Never     Comment: occasional                                Counseling given: Not Answered  Comment: exposed to second hand smoke      Vital Signs (Current):   Vitals:    21 0815 21 0816   BP:  (!) 154/78   Pulse:  67   Resp:  18   Temp:  96.8 °F (36 °C)   TempSrc:  Temporal   SpO2:  99%   Weight: 169 lb 8.5 oz (76.9 kg)    Height: 5' 4\" (1.626 m)                                               BP Readings from Last 3 Encounters:   21 (!) 154/78   21 (!) 168/86   20 (!) 144/69       NPO Status: Time of last liquid consumption: 2300                        Time of last solid consumption:                         Date of last liquid consumption: 21                        Date of last solid food consumption: 21    BMI:   Wt Readings from Last 3 Encounters:   21 169 lb 8.5 oz (76.9 kg)   21 169 lb 9.6 oz (76.9 kg)   20 175 lb 14.4 oz (79.8 kg)     Body mass index is 29.1 kg/m².     CBC:   Lab Results   Component Value Date    WBC 6.5 2021    RBC 4.53 Prophylactic antiemetics administered. Anesthetic plan and risks discussed with patient. Plan discussed with CRNA.     Attending anesthesiologist reviewed and agrees with Saima Cruz MD   3/2/2021

## 2021-03-02 NOTE — H&P
History and Physical Service   [facility] - INPATIENT    HISTORY AND PHYSICAL EXAMINATION            Date of Evaluation: 3/2/2021  Patient name:  Escobar Braun  MRN:   [id number]  YOB: 1955  PCP:    Geovanna Orourke MD    History Obtained From:     Patient, Medical record    History of Present Illness: This is Escobar Braun a 76 y.o. female who presents today for a laparoscopic robotic incisional hernia repair and left inguinal hernia repair by Dr. oSnia Bañuelos due to incisional and left inguinal hernias. S/p cholecystectomy approximately 20 years ago. Pt states the hernias were discovered on a CT in 11/2020 when she had acute pancreatitis. Pt denies abdominal pain, groin pain, nausea, vomiting, constipation, diarrhea, unintentional weight loss, bloating, fevers, chills, chest pain, dyspnea, open sores, and wounds. Pt denies history of diabetes. Pt denies taking blood thinning medications in the past 10 days. CT of the abdomen and pelvis with contrast: 11/30/2020  Impression   Acute pancreatitis.       3.5 cm left inguinal hernia containing a portion of the distal left   descending colon without inflammatory changes or bowel obstruction.       Cystic changes of the endometrial canal.  Follow-up pelvic ultrasound is   recommended for further evaluation and to exclude underlying mass.         Past Medical History:     Past Medical History:   Diagnosis Date    Acid reflux     History of acute pancreatitis 11/2020    resolved    Hypertension     Psoriasis         Past Surgical History:     Past Surgical History:   Procedure Laterality Date    BREAST SURGERY      reduction    CHOLECYSTECTOMY      COLONOSCOPY      TUBAL LIGATION      UPPER GASTROINTESTINAL ENDOSCOPY          Medications Prior to Admission:     Prior to Admission medications    Medication Sig Start Date End Date Taking?  Authorizing Provider   oxyCODONE-acetaminophen (PERCOCET) 5-325 MG per tablet Take 1 tablet by mouth every 6 hours as needed for Pain for up to 7 days. Intended supply: 7 days. Take lowest dose possible to manage pain 3/2/21 3/9/21 Yes Ciera Lax, DO   docusate sodium (COLACE) 100 MG capsule Take 1 capsule by mouth 2 times daily as needed for Constipation 3/2/21 4/1/21 Yes Ciera Lax, DO   ondansetron (ZOFRAN ODT) 4 MG disintegrating tablet Take 1 tablet by mouth every 8 hours as needed for Nausea or Vomiting 3/2/21  Yes Ciera Lax, DO   methocarbamol (ROBAXIN) 500 MG tablet Take 1 tablet by mouth 4 times daily for 7 days 3/2/21 3/9/21 Yes Ciera Lax, DO   omeprazole (PRILOSEC) 20 MG delayed release capsule Take 20 mg by mouth daily   Yes Historical Provider, MD   ELDERBERRY PO Take 5 mLs by mouth daily   Yes Historical Provider, MD   Adalimumab (HUMIRA PEN SC) Inject into the skin every 14 days    Yes Historical Provider, MD   lisinopril (PRINIVIL;ZESTRIL) 20 MG tablet Take 20 mg by mouth daily    Yes Historical Provider, MD   acetaminophen (TYLENOL) 500 MG tablet Take 500 mg by mouth as needed for Pain (headache)    Historical Provider, MD        Allergies:     Macrobid [nitrofurantoin]    Social History:     Tobacco:    reports that she quit smoking about 44 years ago. She has never used smokeless tobacco.  Alcohol:      reports current alcohol use. Drug Use:  reports no history of drug use. Family History:     History reviewed. No pertinent family history. Review of Systems:     Positive and Negative as described in HPI. CONSTITUTIONAL: Negative for fevers, chills, sweats, fatigue, and weight loss. HEENT: Pt wears glasses. Negative for hearing changes, rhinorrhea, and throat pain. RESPIRATORY: Negative for shortness of breath, cough, congestion, and wheezing. CARDIOVASCULAR: Negative for chest pain, blood clot, irregular heartbeat, and palpitations. GASTROINTESTINAL: See HPI. Acid reflux.     GENITOURINARY: Negative for difficulty of urination, burning with urination, and frequency. INTEGUMENT: Psoriasis. Negative for wounds and easy bruising. HEMATOLOGIC/LYMPHATIC: Negative for swelling/edema. ALLERGIC/IMMUNOLOGIC: Negative for urticaria and itching. ENDOCRINE: Negative for increase in drinking, increase in urination, and heat or cold intolerance. MUSCULOSKELETAL: Negative joint pains, muscle aches, and swelling of joints. NEUROLOGICAL: Negative for headaches, dizziness, lightheadedness, numbness, and tingling extremities. BEHAVIOR/PSYCH: Negative for depression and anxiety. Physical Exam:   BP (!) 154/78   Pulse 67   Temp 96.8 °F (36 °C) (Temporal)   Resp 18   Ht 5' 4\" (1.626 m)   Wt 169 lb 8.5 oz (76.9 kg)   SpO2 99%   BMI 29.10 kg/m²   No LMP recorded. Patient is postmenopausal.  No obstetric history on file. No results for input(s): POCGLU in the last 72 hours. General Appearance:  Alert, well appearing, and in no acute distress. Mental status: Oriented to person, place, and time. Head: Normocephalic and atraumatic. Eye: Pt is wearing glasses. No icterus, redness, pupils equal and reactive, extraocular eye movements intact, and conjunctiva clear. Ear: Hearing grossly intact. Nose: No drainage noted. Mouth: Mucous membranes moist.  Neck: Supple and no carotid bruits noted. Lungs: Bilateral equal air entry, clear to auscultation, no wheezing, rales or rhonchi, and normal effort. Cardiovascular: Normal rate, regular rhythm, no murmur, gallop, and rub. Abdomen: Soft, nontender, nondistended, and active bowel sounds. Neurologic: Normal speech and cranial nerves II through XII grossly intact. Strength 5/5 bilaterally. Skin: No gross lesions, rashes, bruising, or bleeding on exposed skin area. Extremities: Posterior tibial pulses 2+ bilaterally. No pedal edema. No calf tenderness with palpation. Psych: Normal affect. Investigations:      Laboratory Testing:  No results found for this or any previous visit (from the past 24 hour(s)).     Recent

## 2021-03-02 NOTE — ANESTHESIA POSTPROCEDURE EVALUATION
Department of Anesthesiology  Postprocedure Note    Patient: Yusef Poe  MRN: [id number]  YOB: 1955  Date of evaluation: 3/2/2021  Time:  2:25 PM     Procedure Summary     Date: 03/02/21 Room / Location: [address] [place] [address] / [facility] - INPATIENT    Anesthesia Start: 3867 Anesthesia Stop: 7618    Procedure: LAPAROSCOPIC ROBOTIC EXTENSIVE LYSIS OF ADHESIONS, ROBOTIC XI  LEFT INGUINAL HERNIA REPAIR (Left Groin) Diagnosis: (DX INCISIONAL AND LEFT ING HERNIA)    Surgeons: Brett Tsai MD Responsible Provider: Pema Abreu MD    Anesthesia Type: general ASA Status: 2          Anesthesia Type: general    Mary Beth Phase I: Mary Beth Score: 10    Mary Beth Phase II: Mary Beth Score: 10    Last vitals: Reviewed and per EMR flowsheets.        Anesthesia Post Evaluation    Patient location during evaluation: PACU  Patient participation: complete - patient participated  Level of consciousness: awake  Airway patency: patent  Nausea & Vomiting: no nausea  Complications: no  Cardiovascular status: blood pressure returned to baseline  Respiratory status: acceptable  Hydration status: euvolemic

## 2021-03-02 NOTE — OP NOTE
Operative Note      Patient: William Avila  YOB: 1955  MRN: [id number]    Date of Procedure: 3/2/2021    Pre-Op Diagnosis: Left inguinal hernia, possible incisional hernia    Post-Op Diagnosis: Chronically incarcerated left inguinal hernia containing sigmoid colon       Procedure(s):  LAPAROSCOPIC ROBOTIC EXTENSIVE LYSIS OF ADHESIONS, ROBOTIC XI  LEFT INGUINAL HERNIA REPAIR WITH MESH    Surgeon(s):  Amado Gutierrez MD    Assistant:   Resident: Isha Katz DO    Anesthesia: General    Estimated Blood Loss (mL): Minimal    Complications: None    Specimens:   * No specimens in log *    Implants:  Implant Name Type Inv. Item Serial No.  Lot No. LRB No. Used Action   MESH SURG W3.5XL6IN POLY SELF FIXATING RECT W/ RESRB JAYCOB  MESH SURG W3.5XL6IN POLY SELF FIXATING RECT W/ RESRB JAYCOB  [employer]  SURGICAL-WD [id number] Left 1 Implanted         Drains:   [REMOVED] Urethral Catheter Double-lumen;Non-latex;Straight-tip 16 fr (Removed)       Findings: Dense intraperitoneal adhesions, left inguinal hernia containing sigmoid colon    Detailed Description of Procedure:   After verbal and written consent, the patient was brought to the operating room. After appropriate monitoring, general anesthesia was administered. A timeout was performed with the staff in the room confirming both the patient and the procedure to be performed. The procedure was begun with a sterile prep and drape. A El catheter was inserted with return of clear yellow urine. A transverse epigastric incision was made with a scalpel. Dissection was carried down through the subcutaneous tissue with cautery. The midline fascia was identified and opened vertically. Transfascial Vicryl sutures were placed. The peritoneal cavity was entered bluntly. A balloontipped trocar was inserted into the peritoneal cavity and pneumoperitoneum was established. The peritoneal cavity was inspected.   There was noted to be extremely dense intraperitoneal adhesions. An additional 8 mm trocar was placed in the left mid abdomen, using a EndoShears, and extensive lysis of adhesions was performed laparoscopically freeing greater omentum from the anterior abdominal wall. Once I was able to create a space to the right of midline, I did place an additional 8 mm trocar. An 8 mm trocar was also telescoped through the balloontipped trocar. The patient was positioned head down. Using a fenestrated bipolar and monopolar scissors, the extensive lysis of adhesions continued deep into the pelvis. The inguinal hernia defect was identified. A peritoneal flap was created on the left. An extraperitoneal plane was dissected. The hernia contents were reduced. An extraperitoneal laparoscopic ProGrip mesh was selected for repair. This was placed in the preperitoneal space and secured with generous circumferential overlap around the hernia defect. The peritoneal flap was then closed with a running V lock suture. At this point, needles and instruments were removed. Pneumoperitoneum was evacuated. The trochars were removed. The epigastric fascial defect was closed with a figure-of-eight PDS suture. Skin was closed with running Monocryl suture. Dermabond was applied to the wounds. The sponge, lap, needle, and instrument count were correct at the end of the case. The patient was awakened from anesthesia and transported to the recovery in stable condition. There were no immediate complications. The El catheter was removed without incident.     Electronically signed by Katerine Cruz MD on 3/2/2021 at 10:39 AM

## 2022-03-30 ENCOUNTER — HOSPITAL ENCOUNTER (OUTPATIENT)
Dept: CT IMAGING | Age: 67
Discharge: HOME | End: 2022-04-01
Payer: MEDICARE

## 2022-03-30 DIAGNOSIS — R10.2 PELVIC PAIN: ICD-10-CM

## 2022-03-30 PROCEDURE — 74176 CT ABD & PELVIS W/O CONTRAST: CPT

## 2022-04-15 ENCOUNTER — HOSPITAL ENCOUNTER (EMERGENCY)
Age: 67
Discharge: HOME | End: 2022-04-15
Attending: EMERGENCY MEDICINE
Payer: MEDICARE

## 2022-04-15 ENCOUNTER — APPOINTMENT (OUTPATIENT)
Dept: GENERAL RADIOLOGY | Age: 67
End: 2022-04-15
Payer: MEDICARE

## 2022-04-15 VITALS
OXYGEN SATURATION: 100 % | DIASTOLIC BLOOD PRESSURE: 93 MMHG | HEART RATE: 82 BPM | SYSTOLIC BLOOD PRESSURE: 166 MMHG | BODY MASS INDEX: 29.53 KG/M2 | RESPIRATION RATE: 16 BRPM | WEIGHT: 173 LBS | HEIGHT: 64 IN | TEMPERATURE: 97.9 F

## 2022-04-15 DIAGNOSIS — R07.89 ATYPICAL CHEST PAIN: Primary | ICD-10-CM

## 2022-04-15 LAB
ABSOLUTE EOS #: 0.47 K/UL (ref 0–0.44)
ABSOLUTE IMMATURE GRANULOCYTE: 0.03 K/UL (ref 0–0.3)
ABSOLUTE LYMPH #: 2.9 K/UL (ref 1.1–3.7)
ABSOLUTE MONO #: 0.76 K/UL (ref 0.1–1.2)
ALBUMIN SERPL-MCNC: 4.5 G/DL (ref 3.5–5.2)
ALP BLD-CCNC: 76 U/L (ref 35–104)
ALT SERPL-CCNC: 21 U/L (ref 5–33)
ANION GAP SERPL CALCULATED.3IONS-SCNC: 12 MMOL/L (ref 9–17)
AST SERPL-CCNC: 21 U/L
BASOPHILS # BLD: 1 % (ref 0–2)
BASOPHILS ABSOLUTE: 0.1 K/UL (ref 0–0.2)
BILIRUB SERPL-MCNC: 0.31 MG/DL (ref 0.3–1.2)
BUN BLDV-MCNC: 17 MG/DL (ref 8–23)
BUN/CREAT BLD: 26 (ref 9–20)
CALCIUM SERPL-MCNC: 8.9 MG/DL (ref 8.6–10.4)
CHLORIDE BLD-SCNC: 101 MMOL/L (ref 98–107)
CO2: 25 MMOL/L (ref 20–31)
CREAT SERPL-MCNC: 0.65 MG/DL (ref 0.5–0.9)
D-DIMER QUANTITATIVE: <0.27 MG/L FEU (ref 0–0.59)
EKG ATRIAL RATE: 88 BPM
EKG P AXIS: 53 DEGREES
EKG P-R INTERVAL: 210 MS
EKG Q-T INTERVAL: 358 MS
EKG QRS DURATION: 92 MS
EKG QTC CALCULATION (BAZETT): 433 MS
EKG R AXIS: -3 DEGREES
EKG T AXIS: 56 DEGREES
EKG VENTRICULAR RATE: 88 BPM
EOSINOPHILS RELATIVE PERCENT: 5 % (ref 1–4)
GFR AFRICAN AMERICAN: >60 ML/MIN
GFR NON-AFRICAN AMERICAN: >60 ML/MIN
GFR SERPL CREATININE-BSD FRML MDRD: ABNORMAL ML/MIN/{1.73_M2}
GLUCOSE BLD-MCNC: 138 MG/DL (ref 70–99)
HCT VFR BLD CALC: 40.3 % (ref 36.3–47.1)
HEMOGLOBIN: 12.9 G/DL (ref 11.9–15.1)
IMMATURE GRANULOCYTES: 0 %
LYMPHOCYTES # BLD: 34 % (ref 24–43)
MAGNESIUM: 2.1 MG/DL (ref 1.6–2.6)
MCH RBC QN AUTO: 30.1 PG (ref 25.2–33.5)
MCHC RBC AUTO-ENTMCNC: 32 G/DL (ref 28.4–34.8)
MCV RBC AUTO: 93.9 FL (ref 82.6–102.9)
MONOCYTES # BLD: 9 % (ref 3–12)
NRBC AUTOMATED: 0 PER 100 WBC
PDW BLD-RTO: 12.4 % (ref 11.8–14.4)
PLATELET # BLD: 281 K/UL (ref 138–453)
PMV BLD AUTO: 9.4 FL (ref 8.1–13.5)
POTASSIUM SERPL-SCNC: 3.5 MMOL/L (ref 3.7–5.3)
RBC # BLD: 4.29 M/UL (ref 3.95–5.11)
SEG NEUTROPHILS: 51 % (ref 36–65)
SEGMENTED NEUTROPHILS ABSOLUTE COUNT: 4.39 K/UL (ref 1.5–8.1)
SODIUM BLD-SCNC: 138 MMOL/L (ref 135–144)
TOTAL PROTEIN: 7.8 G/DL (ref 6.4–8.3)
TROPONIN, HIGH SENSITIVITY: <6 NG/L (ref 0–14)
WBC # BLD: 8.7 K/UL (ref 3.5–11.3)

## 2022-04-15 PROCEDURE — 85025 COMPLETE CBC W/AUTO DIFF WBC: CPT

## 2022-04-15 PROCEDURE — 80053 COMPREHEN METABOLIC PANEL: CPT

## 2022-04-15 PROCEDURE — 99283 EMERGENCY DEPT VISIT LOW MDM: CPT

## 2022-04-15 PROCEDURE — 83735 ASSAY OF MAGNESIUM: CPT

## 2022-04-15 PROCEDURE — 93010 ELECTROCARDIOGRAM REPORT: CPT | Performed by: INTERNAL MEDICINE

## 2022-04-15 PROCEDURE — 84484 ASSAY OF TROPONIN QUANT: CPT

## 2022-04-15 PROCEDURE — 93005 ELECTROCARDIOGRAM TRACING: CPT | Performed by: EMERGENCY MEDICINE

## 2022-04-15 PROCEDURE — 71045 X-RAY EXAM CHEST 1 VIEW: CPT

## 2022-04-15 PROCEDURE — 85379 FIBRIN DEGRADATION QUANT: CPT

## 2022-04-15 RX ORDER — NORTRIPTYLINE HYDROCHLORIDE 10 MG/5ML
SOLUTION ORAL
COMMUNITY

## 2022-04-15 NOTE — ED PROVIDER NOTES
EMERGENCY DEPARTMENT ENCOUNTER    Pt Name: Marcelina Smith  MRN: [id number]  [place] 1955  Date of evaluation: 4/15/22  CHIEF COMPLAINT       Chief Complaint   Patient presents with    Panic Attack    Shortness of Breath     HISTORY OF PRESENT ILLNESS   Patient is a 65-year-old female with PMH of hypertension and acid reflux who presents to the ED for evaluation of chest discomfort. Symptoms started approximately 3 days ago. She describes the chest discomfort as \"butterflies. \"  Also reports mild shortness of breath. Symptoms are low-grade at baseline and at times more severe. No vomiting or diaphoresis. No lower extremity edema. See suggested may be anxiety however she does not carry a diagnosis of anxiety. No other issues at this time. No fevers, abdominal pain, nausea, changes in urine or stool. REVIEW OF SYSTEMS     Review of Systems   All other systems reviewed and are negative.     PASTMEDICAL HISTORY     Past Medical History:   Diagnosis Date    Acid reflux     History of acute pancreatitis 11/2020    resolved    Hypertension     Psoriasis      SURGICAL HISTORY       Past Surgical History:   Procedure Laterality Date    BREAST SURGERY      reduction    CHOLECYSTECTOMY      COLONOSCOPY      HERNIA REPAIR Left 3/2/2021    LAPAROSCOPIC ROBOTIC EXTENSIVE LYSIS OF ADHESIONS, ROBOTIC XI  LEFT INGUINAL HERNIA REPAIR performed by Barry Steven MD at [facility] ENDOSCOPY       CURRENT MEDICATIONS       Previous Medications    ACETAMINOPHEN (TYLENOL) 500 MG TABLET    Take 500 mg by mouth as needed for Pain (headache)    ADALIMUMAB (HUMIRA PEN SC)    Inject into the skin every 14 days     ELDERBERRY PO    Take 5 mLs by mouth daily    LISINOPRIL (PRINIVIL;ZESTRIL) 20 MG TABLET    Take 20 mg by mouth daily     NORTRIPTYLINE (PAMELOR) 10 MG/5ML SOLUTION    1 capsule    OMEPRAZOLE (PRILOSEC) 20 MG DELAYED RELEASE CAPSULE    Take 20 mg by mouth daily ONDANSETRON (ZOFRAN ODT) 4 MG DISINTEGRATING TABLET    Take 1 tablet by mouth every 8 hours as needed for Nausea or Vomiting     ALLERGIES     is allergic to macrobid [nitrofurantoin]. FAMILY HISTORY     has no family status information on file. SOCIAL HISTORY       Social History     Tobacco Use    Smoking status: Former Smoker     Quit date:      Years since quittin.3    Smokeless tobacco: Never Used    Tobacco comment: exposed to second hand smoke   Vaping Use    Vaping Use: Never used   Substance Use Topics    Alcohol use: Yes     Comment: occasional    Drug use: Never     PHYSICAL EXAM     INITIAL VITALS: BP (!) 157/99   Pulse 82   Temp 97.9 °F (36.6 °C) (Oral)   Resp 16   Ht 5' 4\" (1.626 m)   Wt 173 lb (78.5 kg)   SpO2 100%   BMI 29.70 kg/m²    Physical Exam  HENT:      Head: Normocephalic. Right Ear: External ear normal.      Left Ear: External ear normal.      Nose: Nose normal.   Eyes:      Conjunctiva/sclera: Conjunctivae normal.   Cardiovascular:      Rate and Rhythm: Normal rate. Pulmonary:      Effort: Pulmonary effort is normal.   Abdominal:      General: Abdomen is flat. Skin:     General: Skin is dry. Neurological:      Mental Status: She is alert. Mental status is at baseline. Psychiatric:         Mood and Affect: Mood normal.         Behavior: Behavior normal.         MEDICAL DECISION MAKING:   The patient is hemodynamically stable, afebrile, nontoxic-appearing. Physical exam unremarkable. Based on history and exam low probability of ACS, PE . consider esophageal spasm, GERD, anxiety. ED plan for basic labs, troponin, EKG, D-dimer, chest x-ray, reassess.           DIAGNOSTIC RESULTS   EKG:All EKG's are interpreted by the Emergency Department Physician who either signs or Co-signs this chart in the absence of a cardiologist.        RADIOLOGY:All plain film, CT, MRI, and formal ultrasound images (except ED bedside ultrasound) are read by the radiologist, see reports below, unless otherwisenoted in MDM or here. XR CHEST PORTABLE   Final Result   No acute cardiopulmonary disease. LABS: All lab results were reviewed by myself, and all abnormals are listed below. Labs Reviewed   CBC WITH AUTO DIFFERENTIAL - Abnormal; Notable for the following components:       Result Value    Eosinophils % 5 (*)     Absolute Eos # 0.47 (*)     All other components within normal limits   COMPREHENSIVE METABOLIC PANEL W/ REFLEX TO MG FOR LOW K - Abnormal; Notable for the following components:    Glucose 138 (*)     Bun/Cre Ratio 26 (*)     Potassium 3.5 (*)     All other components within normal limits   TROPONIN   D-DIMER, QUANTITATIVE   MAGNESIUM       EMERGENCY DEPARTMENTCOURSE:   Patient did well in the ED. EKG nonischemic. Labs:  Potassium 3.5  Creatinine 0.65  WBC 8.7  Troponin   D-dimer 0.27, negative  Troponin 6  Chest x-ray negative for infiltrate. Most likely anxiety. No further work-up indicated at this time. Nursing notes reviewed. At this time this is what I find, the patient appears well and does not appear sick or toxic. I gave my usual and customary discussion of the risks and benefits of discharge versus admission. I answered the patient's questions. I gave the patient strict return precautions. Patient expressed understanding of the discharge instructions. The care is provided during an unprecedented national emergency due to the novel coronavirus, COVID-19. Dictated but not reviewed. Vitals:    Vitals:    04/15/22 1239   BP: (!) 157/99   Pulse: 82   Resp: 16   Temp: 97.9 °F (36.6 °C)   TempSrc: Oral   SpO2: 100%   Weight: 173 lb (78.5 kg)   Height: 5' 4\" (1.626 m)       The patient was given the following medications while in the emergency department:  No orders of the defined types were placed in this encounter. CONSULTS:  None    FINAL IMPRESSION      1.  Atypical chest pain          DISPOSITION/PLAN   DISPOSITION Decision To Discharge 04/15/2022 01:48:47 PM      PATIENT REFERRED TO:  Candy Howard MD  [address] [facility], [address] [place] [address] [phone]    In 2 days      DISCHARGE MEDICATIONS:  New Prescriptions    No medications on file     Stefan Gaines MD  Attending Emergency Physician                    Milli Galindo MD  04/15/22 3215

## 2023-02-25 ENCOUNTER — APPOINTMENT (OUTPATIENT)
Dept: GENERAL RADIOLOGY | Age: 68
End: 2023-02-25
Payer: MEDICARE

## 2023-02-25 ENCOUNTER — HOSPITAL ENCOUNTER (EMERGENCY)
Age: 68
Discharge: HOME | End: 2023-02-25
Attending: EMERGENCY MEDICINE
Payer: MEDICARE

## 2023-02-25 VITALS
RESPIRATION RATE: 13 BRPM | BODY MASS INDEX: 30.22 KG/M2 | TEMPERATURE: 98.9 F | HEART RATE: 61 BPM | DIASTOLIC BLOOD PRESSURE: 76 MMHG | SYSTOLIC BLOOD PRESSURE: 147 MMHG | HEIGHT: 64 IN | WEIGHT: 177 LBS | OXYGEN SATURATION: 96 %

## 2023-02-25 DIAGNOSIS — I10 PRIMARY HYPERTENSION: Primary | ICD-10-CM

## 2023-02-25 PROBLEM — R93.89 THICKENED ENDOMETRIUM: Status: ACTIVE | Noted: 2021-09-14

## 2023-02-25 LAB
ABSOLUTE EOS #: 0.34 K/UL (ref 0–0.44)
ABSOLUTE IMMATURE GRANULOCYTE: 0.01 K/UL (ref 0–0.3)
ABSOLUTE LYMPH #: 1.75 K/UL (ref 1.1–3.7)
ABSOLUTE MONO #: 0.71 K/UL (ref 0.1–1.2)
ANION GAP SERPL CALCULATED.3IONS-SCNC: 11 MMOL/L (ref 9–17)
BASOPHILS # BLD: 1 % (ref 0–2)
BASOPHILS ABSOLUTE: 0.08 K/UL (ref 0–0.2)
BUN SERPL-MCNC: 8 MG/DL (ref 8–23)
BUN/CREAT BLD: 13 (ref 9–20)
CALCIUM SERPL-MCNC: 9.1 MG/DL (ref 8.6–10.4)
CHLORIDE SERPL-SCNC: 107 MMOL/L (ref 98–107)
CO2 SERPL-SCNC: 24 MMOL/L (ref 20–31)
CREAT SERPL-MCNC: 0.61 MG/DL (ref 0.5–0.9)
EOSINOPHILS RELATIVE PERCENT: 6 % (ref 1–4)
GFR SERPL CREATININE-BSD FRML MDRD: >60 ML/MIN/1.73M2
GLUCOSE SERPL-MCNC: 136 MG/DL (ref 70–99)
HCT VFR BLD AUTO: 40 % (ref 36.3–47.1)
HGB BLD-MCNC: 12.8 G/DL (ref 11.9–15.1)
IMMATURE GRANULOCYTES: 0 %
LYMPHOCYTES # BLD: 30 % (ref 24–43)
MCH RBC QN AUTO: 29.2 PG (ref 25.2–33.5)
MCHC RBC AUTO-ENTMCNC: 32 G/DL (ref 28.4–34.8)
MCV RBC AUTO: 91.1 FL (ref 82.6–102.9)
MONOCYTES # BLD: 12 % (ref 3–12)
NRBC AUTOMATED: 0 PER 100 WBC
PDW BLD-RTO: 12.3 % (ref 11.8–14.4)
PLATELET # BLD AUTO: 282 K/UL (ref 138–453)
PMV BLD AUTO: 9.8 FL (ref 8.1–13.5)
POTASSIUM SERPL-SCNC: 3.8 MMOL/L (ref 3.7–5.3)
RBC # BLD: 4.39 M/UL (ref 3.95–5.11)
SEG NEUTROPHILS: 51 % (ref 36–65)
SEGMENTED NEUTROPHILS ABSOLUTE COUNT: 2.98 K/UL (ref 1.5–8.1)
SODIUM SERPL-SCNC: 142 MMOL/L (ref 135–144)
TROPONIN I SERPL DL<=0.01 NG/ML-MCNC: 6 NG/L (ref 0–14)
TROPONIN I SERPL DL<=0.01 NG/ML-MCNC: 8 NG/L (ref 0–14)
WBC # BLD AUTO: 5.9 K/UL (ref 3.5–11.3)

## 2023-02-25 PROCEDURE — 84484 ASSAY OF TROPONIN QUANT: CPT

## 2023-02-25 PROCEDURE — 93005 ELECTROCARDIOGRAM TRACING: CPT

## 2023-02-25 PROCEDURE — 80048 BASIC METABOLIC PNL TOTAL CA: CPT

## 2023-02-25 PROCEDURE — 71046 X-RAY EXAM CHEST 2 VIEWS: CPT

## 2023-02-25 PROCEDURE — 99285 EMERGENCY DEPT VISIT HI MDM: CPT

## 2023-02-25 PROCEDURE — 85025 COMPLETE CBC W/AUTO DIFF WBC: CPT

## 2023-02-25 ASSESSMENT — ENCOUNTER SYMPTOMS
NAUSEA: 0
SORE THROAT: 0
CONSTIPATION: 0
VOMITING: 0
BLOOD IN STOOL: 0
SHORTNESS OF BREATH: 1
CHEST TIGHTNESS: 0
DIARRHEA: 0
ABDOMINAL PAIN: 0
PHOTOPHOBIA: 0
COUGH: 1
BACK PAIN: 0
STRIDOR: 0
SINUS PRESSURE: 0
SINUS PAIN: 0

## 2023-02-25 NOTE — ED PROVIDER NOTES
[facility] ED  EMERGENCY DEPARTMENT ENCOUNTER   ATTENDING ATTESTATION     Pt Name: Jean-Pierre Haley  MRN: [id number]  [place] 1955  Date of evaluation: 2/25/23       Jean-Pierre Haley is a 75 y.o. female who presents with Hypertension (Pt states her BP has been in the 190s) and Shortness of Breath      MDM:     Patient's EKG shows sinus rhythm at a rate of 76, MI QRS QTc intervals unremarkable the patient has left axis deviation no ST elevations or depressions, nonspecific EKG. Vitals:   Vitals:    02/25/23 0954 02/25/23 0955 02/25/23 1032 02/25/23 1115   BP:  (!) 179/80 (!) 163/95 (!) 151/76   Pulse: 80   63   Resp: 16   15   Temp: 98.9 °F (37.2 °C)      TempSrc: Oral      SpO2: 97%   94%   Weight: 177 lb (80.3 kg)      Height: 5' 4\" (1.626 m)            This visit was performed by both a physician and an APC. I personally evaluated and examined the patient.  I performed all aspects of the MDM as documented     Aga Rogers MD  Attending Emergency  Physician                  Latisha Head MD  02/25/23 4021

## 2023-02-25 NOTE — DISCHARGE INSTRUCTIONS
Check your blood pressure for the next 2 weeks, every day and write down. Check during different times during the day. Follow-up with your primary care provider on Monday to discuss adding additional blood pressure medications or adjusting your current lisinopril dose. You may need to take blood pressure medications for the remainder of your life. Limit the amount of salt that you use. Increase amount of exercise (3 - 4 times a week for minimal of 20 minutes each times). Eat fresh foods. PLEASE RETURN TO THE EMERGENCY DEPARTMENT IMMEDIATELY for worsening symptoms, or if you develop any concerning symptoms such as: high fever not relieved by acetaminophen (Tylenol) and/or ibuprofen (Motrin / Advil), chills, shortness of breath, chest pain, feeling of your heart fluttering or racing, persistent nausea and/or vomiting, vomiting up blood, blood in your stool, numbness, loss of consciousness, weakness or tingling in the arms or legs or change in color of the extremities, changes in mental status, persistent headache, blurry vision, loss of bladder / bowel control, unable to follow up with your physician, or other any other care or concern.

## 2023-02-25 NOTE — ED PROVIDER NOTES
[facility]  eMERGENCY dEPARTMENT eNCOUnter      Pt Name: Natalee Carranza  MRN: [id number]  [place] 1955  Date of evaluation: 2/25/2023  Provider: Allie Bumpers, APRN - CNP    CHIEF COMPLAINT       Chief Complaint   Patient presents with    Hypertension     Pt states her BP has been in the 190s    Shortness of Breath         HISTORY OF PRESENT ILLNESS  (Location/Symptom, Timing/Onset, Context/Setting, Quality, Duration, Modifying Factors, Severity.)   Natalee Carranza is a 75 y.o. female who presents to the emergency department via private auto with complaint of dizziness, shortness of breath this morning. Patient states she was at gastroenterologist yesterday when her blood pressure read 125 systolic, patient states her doctor told her to recheck it tonight to make sure that had gone down. Patient states her blood pressure remained high last night and rechecked it this morning and it was also high, 466 systolic as well. Patient states she took her nightly lisinopril this morning prior to arrival.  Patient states she began having shortness of breath even at rest today as well as some dizziness, denies any vision changes or headache. She denies any chest pain, nausea, vomiting or diarrhea. She does not appear to be in any acute distress, denies any pain at this time. Nursing Notes were reviewed.     ALLERGIES     Macrobid [nitrofurantoin]    CURRENT MEDICATIONS       Previous Medications    ACETAMINOPHEN (TYLENOL) 500 MG TABLET    Take 500 mg by mouth as needed for Pain (headache)    ADALIMUMAB (HUMIRA PEN SC)    Inject into the skin every 14 days     ELDERBERRY PO    Take 5 mLs by mouth daily    LISINOPRIL (PRINIVIL;ZESTRIL) 20 MG TABLET    Take 20 mg by mouth daily     NORTRIPTYLINE (PAMELOR) 10 MG/5ML SOLUTION    1 capsule    OMEPRAZOLE (PRILOSEC) 20 MG DELAYED RELEASE CAPSULE    Take 20 mg by mouth daily    ONDANSETRON (ZOFRAN ODT) 4 MG DISINTEGRATING TABLET    Take 1 tablet by mouth every 8 hours as needed for Nausea or Vomiting       PAST MEDICAL HISTORY         Diagnosis Date    Acid reflux     History of acute pancreatitis 11/2020    resolved    Hypertension     Psoriasis        SURGICAL HISTORY           Procedure Laterality Date    BREAST SURGERY      reduction    CHOLECYSTECTOMY      COLONOSCOPY      HERNIA REPAIR Left 3/2/2021    LAPAROSCOPIC ROBOTIC EXTENSIVE LYSIS OF ADHESIONS, ROBOTIC XI  LEFT INGUINAL HERNIA REPAIR performed by James De Luna MD at [facility]. 56     History reviewed. No pertinent family history. No family status information on file. SOCIAL HISTORY      reports that she quit smoking about 46 years ago. She has never used smokeless tobacco. She reports current alcohol use. She reports that she does not use drugs. REVIEW OF SYSTEMS    (2-9 systems for level 4, 10 or more for level 5)   Review of Systems   Constitutional:  Negative for activity change, appetite change, chills, diaphoresis, fatigue and fever. HENT:  Negative for congestion, ear pain, sinus pressure, sinus pain and sore throat. Eyes:  Negative for photophobia and visual disturbance. Respiratory:  Positive for cough and shortness of breath (dry cough, states it is chronic from GERD). Negative for chest tightness and stridor. Cardiovascular:  Negative for chest pain, palpitations and leg swelling. Gastrointestinal:  Negative for abdominal pain, blood in stool, constipation, diarrhea, nausea and vomiting. Genitourinary:  Negative for decreased urine volume, difficulty urinating, dysuria, flank pain, frequency, urgency, vaginal discharge and vaginal pain. Musculoskeletal:  Negative for arthralgias, back pain, joint swelling, myalgias and neck pain. Skin:  Negative for pallor and rash. Neurological:  Positive for dizziness and light-headedness.  Negative for syncope, speech difficulty, weakness, numbness and headaches. Except as noted above the remainder of the review of systems was reviewed and negative. PHYSICAL EXAM    (up to 7 for level 4, 8 or more for level 5)     ED Triage Vitals   BP Temp Temp Source Heart Rate Resp SpO2 Height Weight   02/25/23 0955 02/25/23 0954 02/25/23 0954 02/25/23 0954 02/25/23 0954 02/25/23 0954 02/25/23 0954 02/25/23 0954   (!) 179/80 98.9 °F (37.2 °C) Oral 80 16 97 % 5' 4\" (1.626 m) 177 lb (80.3 kg)     Physical Exam  Vitals and nursing note reviewed. Constitutional:       General: She is not in acute distress. Appearance: Normal appearance. She is well-developed and normal weight. She is not ill-appearing, toxic-appearing or diaphoretic. HENT:      Head: Normocephalic and atraumatic. Right Ear: External ear normal.      Left Ear: External ear normal.      Nose: Nose normal. No congestion or rhinorrhea. Mouth/Throat:      Mouth: Mucous membranes are moist.      Pharynx: Oropharynx is clear. Eyes:      General: No scleral icterus. Right eye: No discharge. Left eye: No discharge. Extraocular Movements: Extraocular movements intact. Pupils: Pupils are equal, round, and reactive to light. Neck:      Vascular: No JVD. Cardiovascular:      Rate and Rhythm: Normal rate and regular rhythm. Pulses: Normal pulses. Heart sounds: Normal heart sounds. No murmur heard. Pulmonary:      Effort: Pulmonary effort is normal. No respiratory distress. Breath sounds: Normal breath sounds. No stridor. No decreased breath sounds, wheezing, rhonchi or rales. Chest:      Chest wall: No tenderness. Abdominal:      General: Abdomen is flat. There is no distension. Palpations: Abdomen is soft. Tenderness: There is no abdominal tenderness. There is no right CVA tenderness, left CVA tenderness, guarding or rebound. Musculoskeletal:         General: No deformity or signs of injury. Normal range of motion. Cervical back: Normal range of motion and neck supple. Right lower leg: No tenderness. No edema. Left lower leg: No tenderness. No edema. Lymphadenopathy:      Cervical: No cervical adenopathy. Skin:     General: Skin is warm and dry. Capillary Refill: Capillary refill takes less than 2 seconds. Coloration: Skin is not cyanotic, jaundiced or pale. Findings: No ecchymosis, erythema or rash. Neurological:      General: No focal deficit present. Mental Status: She is alert and oriented to person, place, and time. Cranial Nerves: No cranial nerve deficit. Sensory: No sensory deficit. Motor: No weakness. Gait: Gait normal.   Psychiatric:         Mood and Affect: Mood normal. Mood is not anxious. Behavior: Behavior normal. Behavior is not agitated. Thought Content: Thought content normal.       DIAGNOSTIC RESULTS     EKG: All EKG's are interpreted by the Emergency Department Physician who either signs or Co-signs this chart in the absence of a cardiologist.  EKG was interpreted by Dr. Edison Logan after completion. RADIOLOGY:   Non-plain film images such as CT, Ultrasound and MRI are read by the radiologist. Plain radiographic images are visualized and preliminarily interpreted by the emergency physician with the below findings:    Interpretation per the Radiologist below, if available at the time of this note:    ***      LABS:  Labs Reviewed   BASIC METABOLIC PANEL   CBC WITH AUTO DIFFERENTIAL   TROPONIN       All other labs were within normal range or not returned as of this dictation.     EMERGENCY DEPARTMENT COURSE and DIFFERENTIAL DIAGNOSIS/MDM:   Vitals:    Vitals:    02/25/23 0954 02/25/23 0955   BP:  (!) 179/80   Pulse: 80    Resp: 16    Temp: 98.9 °F (37.2 °C)    TempSrc: Oral    SpO2: 97%    Weight: 177 lb (80.3 kg)    Height: 5' 4\" (1.626 m)        MEDICATIONS GIVEN IN THE ED:  Medications - No data to display    CLINICAL DECISION MAKING:  The patient presented alert with a nontoxic appearance and was seen in conjunction with Dr. Theresa Barajas. Patient presents to the ER with complaint described above. Dizziness and shortness of breath that are continuous that started this morning. Patient has had 3 high blood pressure readings since yesterday, systolic blood pressure in the 190s. Patient is not having any chest pain, abdominal pain, nausea or vomiting. She denies any calf swelling, pain with inspiration, exogenous hormone use or recent long trips or flights. Patient reports a chronic dry cough associated with her GERD. She is well-appearing, does not appear to be in any acute distress, she is not tachycardic and afebrile and her blood pressure is 179/80 upon arrival.  Patient did take her nightly 20 mg lisinopril prior to arrival today. She is not having a headache, denies any vision changes or weakness. Differential diagnosis considered: ACS, hypertensive emergency, deydration    Chronic Conditions affecting care (DM,HTN,CA, etc): HTN    Social Determinants of Health affecting care (unable to care for self, lives alone, unemployed, homeless,etc): none    History source(s) (patient,spouse,parent,family,friend,EMS,etc): patient    Review of external sources (ECF,Hospital records,EMS report, radiology reports, etc): hospital records    Tests considered but not ordered: Not applicable    Independent interpretation of tests (eg.  X-ray, CAT scan, Doppler studies, EKG): EKG by Dr Theresa Barajas    Discussion of x-ray results with radiology: n/a    Consults: ***    Consideration for admission/observation (even if discharged): ***    Prescription considerations: ***    Sepsis considered: patient does not meet SIRS criteria    Critical Care note written: ***    I will order labs including CBC, BMP, troponin, EKG, complete CXR and monitor closely. The patient was involved in his/her plan of care.  The tests that were ordered were discussed with the patient. Any medications that may have been ordered were discussed with the patient. I have reviewed the patient's previous medical records. Labs and imaging were reviewed. I have consulted ***. The patient will be admitted to ***, he/she agrees to accept the patient for further evaluation and treatment. I have discusssed recommendation for admission with the patient and family. We have discussed benefits of admission and the risks of discharge home. The patient agrees with the plan of care and admission. The patient will be admitted for further evaluation and treatment. Evaluation and treatment course in the ED, and plan of care upon discharge was discussed in length with the patient. Patient had no further questions prior to being discharged and was instructed to return to the ED for new or worsening symptoms. Care was provided during an unprecedented national emergency due to the novel coronavirus, Covid-19. Code status: ***      MIPS  ***  Controlled Substances Monitoring:     CONSULTS:  None    PROCEDURES:  ***    FINAL IMPRESSION    No diagnosis found. Problem List  Patient Active Problem List   Diagnosis Code    Thickened endometrium R93.89         DISPOSITION/PLAN   DISPOSITION        PATIENT REFERRED TO:   No follow-up provider specified.     DISCHARGE MEDICATIONS:     New Prescriptions    No medications on file           (Please note that portions of this note were completed with a voice recognition program.  Efforts were made to edit the dictations but occasionally words are mis-transcribed.)    DANIEL Ortega - CNP CAD and Obesity    Wells Criteria for Pulmonary Embolism  Criteria Score Response Patient Score   Clinical Signs & Symptoms of DVT 3 No 0   PE is Likely Primary Diagnosis 3 No 0   Heart Rate ? 100 1.5 No 0   Immobilization at least 3 days, or surgery in the previous 4 weeks 1.5 No 0   Previous objective diagnosis of PE or DVT 1.5 No 0   Hemoptysis 1 No 0   Malignancy with treatment within 6 months or palliative 1 No 0   Low Risk 0-1 1.3% chance of PE 0   Moderate Risk 1-6 16.2% chance of PE    High Risk >7 37.5% chance of PE        The patient was involved in his/her plan of care. The tests that were ordered were discussed with the patient. Any medications that may have been ordered were discussed with the patient. I have reviewed the patient's previous medical records. Labs and imaging were reviewed. -Patient's cardiac work-up is unremarkable. Her blood pressure stabilized during her visit, she took her home medication of 20 mg of lisinopril PTA. We spoke about possible need for an additional anti-hypertensive medication to be added or an adjustment be made with her current medication in order to better control her blood pressure long term. She agrees with the plan to call her PCP as soon as possible and schedule an appointment for re-evaluation on this matter. She is asymptomatic on discharge and her vital signs are stable. The patient presents with chest pain that is not suggesting in nature of pulmonary embolus, aortic dissection, cardiac ischemia, or other serious etiology. I considered an aortic dissection, but this is unlikely as patient is not complaining of tearing or ripping chest pain that is radiating to the back, the patient has no new neurological abnormalities and pulses are equal to all extremities. Mediastinum is within normal limits. Patient appears comfortable on physical exam and is not in distress.  I also thought about a cardiac tamponade, but this is unlikely as patient is hemodynamically stable. Heart sounds are not distant, EKG does not show signs of electrical alternans and there is no JVD. I also thought about a tension pneumothorax, but this is unlikely given bilateral breath sounds and no signs of hemodynamic instability. I do not feel the patient has a PE. No clinical evidence of DVT. I thought about an esophageal perforation, but history and physical exam does not suggest vomiting, followed by chest pain. No signs of Hamman's crunch on physical exam; again, patient appears comfortable and is well appearing and non toxic. The patient has been instructed to return if the symptpoms change or worsen in any way. Given the extremely low risk of these diagnoses further testing and evaluation for these possibilites are not indicated at this time. The patient appears stable for discharge and has been instructed to return immediately if the symptoms worsen in any way, or in 8-12 hr if not improved for re-evaluation. We also discussed returning to the Emergency Department immediately if new or worsening symptoms occur. We have discussed the symptoms which are most concerning (e.g., worsening pain, shortness of breath, a feeling of passing out, fever, any neurologic symptoms, abdominal pain or vomiting) that necessitate immediate return. The patient understands that at this time there is no evidence for a more malignant underlying process, but the patient also understands that early in the process of an illness or injury, an emergency department workup can be falsely reassuring. Routine discharge counseling was given, and the patient understands that worsening, changing or persistent symptoms should prompt an immediate call or follow up with their primary physician or return to the emergency department. The importance of appropriate follow up was also discussed. I have reviewed the disposition diagnosis with the patient and or their family/guardian.   I have answered their questions and given discharge instructions. They voiced understanding of these instructions and did not have any further questions or complaints. Evaluation and treatment course in the ED, and plan of care upon discharge was discussed in length with the patient. Patient had no further questions prior to being discharged and was instructed to return to the ED for new or worsening symptoms. Care was provided during an unprecedented national emergency due to the novel coronavirus, Covid-19. FINAL IMPRESSION      1.  Primary hypertension            Problem List  Patient Active Problem List   Diagnosis Code    Thickened endometrium R93.89         DISPOSITION/PLAN   DISPOSITION Decision To Discharge 02/25/2023 12:25:13 PM      PATIENT REFERRED TO:   Sung Martin MD  [address] [address]  [phone]    Schedule an appointment as soon as possible for a visit in 2 days      [facility]  [address]  [phone]  Go to   New or worsening symptoms    DISCHARGE MEDICATIONS:     Discharge Medication List as of 2/25/2023 12:26 PM              (Please note that portions of this note were completed with a voice recognition program.  Efforts were made to edit the dictations but occasionally words are mis-transcribed.)    DANIEL Conley - DANIEL Mishra CNP  02/28/23 [id number]

## 2023-02-26 LAB
EKG ATRIAL RATE: 76 BPM
EKG P AXIS: 40 DEGREES
EKG P-R INTERVAL: 188 MS
EKG Q-T INTERVAL: 374 MS
EKG QRS DURATION: 80 MS
EKG QTC CALCULATION (BAZETT): 420 MS
EKG R AXIS: 3 DEGREES
EKG T AXIS: 49 DEGREES
EKG VENTRICULAR RATE: 76 BPM

## 2023-11-13 ENCOUNTER — HOSPITAL ENCOUNTER (OUTPATIENT)
Age: 68
Setting detail: SPECIMEN
Discharge: HOME | End: 2023-11-13

## 2023-11-13 LAB
ALBUMIN SERPL-MCNC: 4.3 G/DL (ref 3.5–5.2)
ALBUMIN/GLOB SERPL: 1 {RATIO} (ref 1–2.5)
ALP SERPL-CCNC: 69 U/L (ref 35–104)
ALT SERPL-CCNC: 26 U/L (ref 10–35)
ANION GAP SERPL CALCULATED.3IONS-SCNC: 12 MMOL/L (ref 9–16)
AST SERPL-CCNC: 30 U/L (ref 10–35)
BASOPHILS # BLD: 0.12 K/UL (ref 0–0.2)
BASOPHILS NFR BLD: 1 % (ref 0–2)
BILIRUB DIRECT SERPL-MCNC: <0.2 MG/DL (ref 0–0.3)
BILIRUB INDIRECT SERPL-MCNC: 0.5 MG/DL (ref 0–1)
BILIRUB SERPL-MCNC: 0.7 MG/DL (ref 0–1.2)
BUN SERPL-MCNC: 12 MG/DL (ref 8–23)
CALCIUM SERPL-MCNC: 9.3 MG/DL (ref 8.6–10.4)
CHLORIDE SERPL-SCNC: 104 MMOL/L (ref 98–107)
CHOLEST SERPL-MCNC: 204 MG/DL (ref 0–199)
CHOLESTEROL/HDL RATIO: 3
CO2 SERPL-SCNC: 25 MMOL/L (ref 20–31)
CREAT SERPL-MCNC: 0.8 MG/DL (ref 0.5–0.9)
EOSINOPHIL # BLD: 0.45 K/UL (ref 0–0.44)
EOSINOPHILS RELATIVE PERCENT: 5 % (ref 1–4)
ERYTHROCYTE [DISTWIDTH] IN BLOOD BY AUTOMATED COUNT: 12.7 % (ref 11.8–14.4)
EST. AVERAGE GLUCOSE BLD GHB EST-MCNC: 151 MG/DL
GLUCOSE SERPL-MCNC: 126 MG/DL (ref 74–99)
HBA1C MFR BLD: 6.9 % (ref 4–6)
HCT VFR BLD AUTO: 42.1 % (ref 36.3–47.1)
HDLC SERPL-MCNC: 68 MG/DL
HGB BLD-MCNC: 13.2 G/DL (ref 11.9–15.1)
IMM GRANULOCYTES # BLD AUTO: 0.05 K/UL (ref 0–0.3)
IMM GRANULOCYTES NFR BLD: 1 %
LDLC SERPL CALC-MCNC: 118 MG/DL (ref 0–100)
LYMPHOCYTES NFR BLD: 3.46 K/UL (ref 1.1–3.7)
LYMPHOCYTES RELATIVE PERCENT: 42 % (ref 24–43)
MCH RBC QN AUTO: 29.9 PG (ref 25.2–33.5)
MCHC RBC AUTO-ENTMCNC: 31.4 G/DL (ref 28.4–34.8)
MCV RBC AUTO: 95.5 FL (ref 82.6–102.9)
MONOCYTES NFR BLD: 0.85 K/UL (ref 0.1–1.2)
MONOCYTES NFR BLD: 10 % (ref 3–12)
NEUTROPHILS NFR BLD: 41 % (ref 36–65)
NEUTS SEG NFR BLD: 3.48 K/UL (ref 1.5–8.1)
NRBC BLD-RTO: 0 PER 100 WBC
PLATELET # BLD AUTO: 300 K/UL (ref 138–453)
PMV BLD AUTO: 11 FL (ref 8.1–13.5)
POTASSIUM SERPL-SCNC: 4 MMOL/L (ref 3.7–5.3)
PROT SERPL-MCNC: 7.2 G/DL (ref 6.6–8.7)
RBC # BLD AUTO: 4.41 M/UL (ref 3.95–5.11)
SODIUM SERPL-SCNC: 141 MMOL/L (ref 136–145)
TRIGL SERPL-MCNC: 90 MG/DL (ref 0–149)
TSH SERPL DL<=0.05 MIU/L-ACNC: 2.79 UIU/ML (ref 0.27–4.2)
VLDLC SERPL CALC-MCNC: 18 MG/DL
WBC OTHER # BLD: 8.4 K/UL (ref 3.5–11.3)

## 2023-11-22 ENCOUNTER — APPOINTMENT (OUTPATIENT)
Dept: GENERAL RADIOLOGY | Age: 68
End: 2023-11-22
Payer: MEDICARE

## 2023-11-22 ENCOUNTER — APPOINTMENT (OUTPATIENT)
Dept: CT IMAGING | Age: 68
End: 2023-11-22
Payer: MEDICARE

## 2023-11-22 ENCOUNTER — HOSPITAL ENCOUNTER (EMERGENCY)
Age: 68
Discharge: HOME | End: 2023-11-22
Attending: EMERGENCY MEDICINE
Payer: MEDICARE

## 2023-11-22 VITALS
SYSTOLIC BLOOD PRESSURE: 158 MMHG | TEMPERATURE: 97.6 F | RESPIRATION RATE: 18 BRPM | BODY MASS INDEX: 30.39 KG/M2 | WEIGHT: 177.03 LBS | OXYGEN SATURATION: 98 % | HEART RATE: 72 BPM | DIASTOLIC BLOOD PRESSURE: 76 MMHG

## 2023-11-22 DIAGNOSIS — I10 ESSENTIAL HYPERTENSION: ICD-10-CM

## 2023-11-22 DIAGNOSIS — R42 DIZZINESS: Primary | ICD-10-CM

## 2023-11-22 LAB
ANION GAP SERPL CALCULATED.3IONS-SCNC: 13 MMOL/L (ref 9–17)
BASOPHILS # BLD: 0.1 K/UL (ref 0–0.2)
BASOPHILS NFR BLD: 1 % (ref 0–2)
BUN SERPL-MCNC: 15 MG/DL (ref 8–23)
BUN/CREAT SERPL: 21 (ref 9–20)
CALCIUM SERPL-MCNC: 9.1 MG/DL (ref 8.6–10.4)
CHLORIDE SERPL-SCNC: 102 MMOL/L (ref 98–107)
CO2 SERPL-SCNC: 23 MMOL/L (ref 20–31)
CREAT SERPL-MCNC: 0.7 MG/DL (ref 0.5–0.9)
EOSINOPHIL # BLD: 0.51 K/UL (ref 0–0.44)
EOSINOPHILS RELATIVE PERCENT: 5 % (ref 1–4)
ERYTHROCYTE [DISTWIDTH] IN BLOOD BY AUTOMATED COUNT: 12.3 % (ref 11.8–14.4)
GFR SERPL CREATININE-BSD FRML MDRD: >60 ML/MIN/1.73M2
GLUCOSE SERPL-MCNC: 96 MG/DL (ref 70–99)
HCT VFR BLD AUTO: 39.1 % (ref 36.3–47.1)
HGB BLD-MCNC: 13.2 G/DL (ref 11.9–15.1)
IMM GRANULOCYTES # BLD AUTO: 0.02 K/UL (ref 0–0.3)
IMM GRANULOCYTES NFR BLD: 0 %
LYMPHOCYTES NFR BLD: 3.42 K/UL (ref 1.1–3.7)
LYMPHOCYTES RELATIVE PERCENT: 36 % (ref 24–43)
MCH RBC QN AUTO: 30.8 PG (ref 25.2–33.5)
MCHC RBC AUTO-ENTMCNC: 33.8 G/DL (ref 28.4–34.8)
MCV RBC AUTO: 91.1 FL (ref 82.6–102.9)
MONOCYTES NFR BLD: 0.81 K/UL (ref 0.1–1.2)
MONOCYTES NFR BLD: 9 % (ref 3–12)
MYOGLOBIN SERPL-MCNC: <21 NG/ML (ref 25–58)
MYOGLOBIN SERPL-MCNC: <21 NG/ML (ref 25–58)
NEUTROPHILS NFR BLD: 49 % (ref 36–65)
NEUTS SEG NFR BLD: 4.56 K/UL (ref 1.5–8.1)
NRBC BLD-RTO: 0 PER 100 WBC
PLATELET # BLD AUTO: 272 K/UL (ref 138–453)
PMV BLD AUTO: 10.2 FL (ref 8.1–13.5)
POTASSIUM SERPL-SCNC: 3.5 MMOL/L (ref 3.7–5.3)
RBC # BLD AUTO: 4.29 M/UL (ref 3.95–5.11)
SODIUM SERPL-SCNC: 138 MMOL/L (ref 135–144)
TROPONIN I SERPL HS-MCNC: <6 NG/L (ref 0–14)
TROPONIN I SERPL HS-MCNC: <6 NG/L (ref 0–14)
WBC OTHER # BLD: 9.4 K/UL (ref 3.5–11.3)

## 2023-11-22 PROCEDURE — 99285 EMERGENCY DEPT VISIT HI MDM: CPT

## 2023-11-22 PROCEDURE — 71045 X-RAY EXAM CHEST 1 VIEW: CPT

## 2023-11-22 PROCEDURE — 70450 CT HEAD/BRAIN W/O DYE: CPT

## 2023-11-22 PROCEDURE — 84484 ASSAY OF TROPONIN QUANT: CPT

## 2023-11-22 PROCEDURE — 80048 BASIC METABOLIC PNL TOTAL CA: CPT

## 2023-11-22 PROCEDURE — 93005 ELECTROCARDIOGRAM TRACING: CPT | Performed by: PHYSICIAN ASSISTANT

## 2023-11-22 PROCEDURE — 85025 COMPLETE CBC W/AUTO DIFF WBC: CPT

## 2023-11-22 PROCEDURE — 96374 THER/PROPH/DIAG INJ IV PUSH: CPT

## 2023-11-22 PROCEDURE — 6360000002 HC RX W HCPCS: Performed by: PHYSICIAN ASSISTANT

## 2023-11-22 PROCEDURE — 83874 ASSAY OF MYOGLOBIN: CPT

## 2023-11-22 RX ORDER — HYDRALAZINE HYDROCHLORIDE 20 MG/ML
5 INJECTION INTRAMUSCULAR; INTRAVENOUS ONCE
Status: COMPLETED | OUTPATIENT
Start: 2023-11-22 | End: 2023-11-22

## 2023-11-22 RX ADMIN — HYDRALAZINE HYDROCHLORIDE 5 MG: 20 INJECTION, SOLUTION INTRAMUSCULAR; INTRAVENOUS at 15:58

## 2023-11-22 ASSESSMENT — PAIN - FUNCTIONAL ASSESSMENT: PAIN_FUNCTIONAL_ASSESSMENT: NONE - DENIES PAIN

## 2023-11-22 NOTE — ED PROVIDER NOTES
[facility]  eMERGENCY dEPARTMENTeNCOUnter      Pt Name: Olena Khan  MRN: [id number]  [address] 1955  Date ofevaluation: 11/22/2023  Provider: Adams Cat PA-C    CHIEF COMPLAINT       Chief Complaint   Patient presents with    Dizziness    Shortness of Breath     States that this has been ongoing for the last couple days          HISTORY OF PRESENT ILLNESS  (Location/Symptom, Timing/Onset, Context/Setting, Quality, Duration, Modifying Factors, Severity.)   Olena Khan is a 75 y.o. female who presents to the emergency department with shortness of breath and dizziness. Reports elevated blood pressure and headache over the last couple days. Blood pressures been in the 180s at home. No nausea or vomiting. No fevers or chills. Denies any chest pain. Nursing Notes were reviewed.     ALLERGIES     Macrobid [nitrofurantoin]    CURRENT MEDICATIONS       Previous Medications    ACETAMINOPHEN (TYLENOL) 500 MG TABLET    Take 500 mg by mouth as needed for Pain (headache)    ADALIMUMAB (HUMIRA PEN SC)    Inject into the skin every 14 days     ELDERBERRY PO    Take 5 mLs by mouth daily    LISINOPRIL (PRINIVIL;ZESTRIL) 20 MG TABLET    Take 20 mg by mouth daily     NORTRIPTYLINE (PAMELOR) 10 MG/5ML SOLUTION    1 capsule    OMEPRAZOLE (PRILOSEC) 20 MG DELAYED RELEASE CAPSULE    Take 20 mg by mouth daily    ONDANSETRON (ZOFRAN ODT) 4 MG DISINTEGRATING TABLET    Take 1 tablet by mouth every 8 hours as needed for Nausea or Vomiting       PAST MEDICAL HISTORY         Diagnosis Date    Acid reflux     History of acute pancreatitis 11/2020    resolved    Hypertension     Psoriasis        SURGICAL HISTORY           Procedure Laterality Date    BREAST SURGERY      reduction    CHOLECYSTECTOMY      COLONOSCOPY      HERNIA REPAIR Left 3/2/2021    LAPAROSCOPIC ROBOTIC EXTENSIVE LYSIS OF ADHESIONS, ROBOTIC XI  LEFT INGUINAL HERNIA REPAIR performed by Cecil Jang MD at [address] [facility]

## 2023-11-22 NOTE — ED PROVIDER NOTES
EMERGENCY DEPARTMENT ENCOUNTER   ATTENDING ATTESTATION     Pt Name: Viktoria Parmar  MRN: [id number]  [address] 1955  Date of evaluation: 11/22/23   Viktoria Parmar is a 75 y.o. female with CC: Dizziness and Shortness of Breath (States that this has been ongoing for the last couple days )    MDM:     23-year-old female presents to the ED for shortness of breath and dizziness for the past few days. She is well-appearing with stable vital signs, clear lungs, oxygen saturation 98% on room air, nonfocal neurologic exam.  Workup in the ED is unremarkable including negative CT of the head, negative troponin x 2. On reassessment patient is asymptomatic, low concern for acute life-threatening process, she was given instructions to hydrate, given return precautions, discharged in the ED in stable condition with instructions to follow-up with her PCP. EKG: All EKG's are interpreted by the Emergency Department Physician who either signs or Co-signs this chart in the absence of a cardiologist.    NSR, nonspecific changes, no acute ischemic changes on ST segments, no change compared to old, normal rate and normal intervals      RADIOLOGY:All plain film, CT, MRI, and formal ultrasound images (except ED bedside ultrasound) are read by the radiologist, see reports below, unless otherwise noted in MDM or here. CT HEAD WO CONTRAST   Final Result   No acute intracranial abnormality. XR CHEST PORTABLE   Final Result   No radiographic evidence of an acute cardiopulmonary process. LABS: All lab results were reviewed by myself, and all abnormals are listed below.   Labs Reviewed   CBC WITH AUTO DIFFERENTIAL - Abnormal; Notable for the following components:       Result Value    Eosinophils % 5 (*)     Eosinophils Absolute 0.51 (*)     All other components within normal limits   BASIC METABOLIC PANEL - Abnormal; Notable for the following components:    Potassium 3.5 (*)     Bun/Cre Ratio 21 (*)     All other

## 2023-11-22 NOTE — ED NOTES
Pt reports HTN and SOB this morning while at rest. States she has been taking her BP medications, but didn't feel well this morning so she took her BP with the wrist cuff and the regular arm cuff she has at home. Became more concerned with onset of SOB. Pt speaking clearly in full sentences without difficulty at this time.       Maritza Chatterjee RN  11/22/23 [id number]

## 2023-11-24 LAB
EKG ATRIAL RATE: 59 BPM
EKG P AXIS: 32 DEGREES
EKG P-R INTERVAL: 194 MS
EKG Q-T INTERVAL: 444 MS
EKG QRS DURATION: 92 MS
EKG QTC CALCULATION (BAZETT): 439 MS
EKG R AXIS: -10 DEGREES
EKG T AXIS: 37 DEGREES
EKG VENTRICULAR RATE: 59 BPM

## 2023-12-26 ENCOUNTER — HOSPITAL ENCOUNTER (OUTPATIENT)
Dept: MAMMOGRAPHY | Age: 68
Discharge: HOME | End: 2023-12-28
Attending: FAMILY MEDICINE
Payer: MEDICARE

## 2023-12-26 DIAGNOSIS — Z78.0 POSTMENOPAUSAL: ICD-10-CM

## 2023-12-26 PROCEDURE — 77080 DXA BONE DENSITY AXIAL: CPT

## 2024-06-29 ENCOUNTER — HOSPITAL ENCOUNTER (EMERGENCY)
Age: 69
Discharge: HOME | End: 2024-06-29
Attending: EMERGENCY MEDICINE
Payer: MEDICARE

## 2024-06-29 ENCOUNTER — APPOINTMENT (OUTPATIENT)
Dept: GENERAL RADIOLOGY | Age: 69
End: 2024-06-29
Payer: MEDICARE

## 2024-06-29 VITALS
DIASTOLIC BLOOD PRESSURE: 71 MMHG | HEART RATE: 58 BPM | OXYGEN SATURATION: 98 % | SYSTOLIC BLOOD PRESSURE: 152 MMHG | WEIGHT: 172 LBS | BODY MASS INDEX: 29.37 KG/M2 | HEIGHT: 64 IN | RESPIRATION RATE: 17 BRPM | TEMPERATURE: 98 F

## 2024-06-29 DIAGNOSIS — H61.23 BILATERAL IMPACTED CERUMEN: Primary | ICD-10-CM

## 2024-06-29 DIAGNOSIS — R06.02 SHORTNESS OF BREATH: ICD-10-CM

## 2024-06-29 LAB
ANION GAP SERPL CALCULATED.3IONS-SCNC: 14 MMOL/L (ref 9–17)
BASOPHILS # BLD: 0.11 K/UL (ref 0–0.2)
BASOPHILS NFR BLD: 2 % (ref 0–2)
BUN SERPL-MCNC: 14 MG/DL (ref 8–23)
BUN/CREAT SERPL: 20 (ref 9–20)
CALCIUM SERPL-MCNC: 9.3 MG/DL (ref 8.6–10.4)
CHLORIDE SERPL-SCNC: 103 MMOL/L (ref 98–107)
CO2 SERPL-SCNC: 22 MMOL/L (ref 20–31)
CREAT SERPL-MCNC: 0.7 MG/DL (ref 0.5–0.9)
D DIMER PPP FEU-MCNC: 0.42 UG/ML FEU (ref 0–0.59)
EKG ATRIAL RATE: 72 BPM
EKG P AXIS: 25 DEGREES
EKG P-R INTERVAL: 204 MS
EKG Q-T INTERVAL: 394 MS
EKG QRS DURATION: 86 MS
EKG QTC CALCULATION (BAZETT): 431 MS
EKG R AXIS: -19 DEGREES
EKG T AXIS: 45 DEGREES
EKG VENTRICULAR RATE: 72 BPM
EOSINOPHIL # BLD: 0.28 K/UL (ref 0–0.44)
EOSINOPHILS RELATIVE PERCENT: 4 % (ref 1–4)
ERYTHROCYTE [DISTWIDTH] IN BLOOD BY AUTOMATED COUNT: 12.5 % (ref 11.8–14.4)
GFR, ESTIMATED: >90 ML/MIN/1.73M2
GLUCOSE SERPL-MCNC: 116 MG/DL (ref 70–99)
HCT VFR BLD AUTO: 40.2 % (ref 36.3–47.1)
HGB BLD-MCNC: 13.2 G/DL (ref 11.9–15.1)
IMM GRANULOCYTES # BLD AUTO: 0.01 K/UL (ref 0–0.3)
IMM GRANULOCYTES NFR BLD: 0 %
LYMPHOCYTES NFR BLD: 2.62 K/UL (ref 1.1–3.7)
LYMPHOCYTES RELATIVE PERCENT: 35 % (ref 24–43)
MCH RBC QN AUTO: 30.1 PG (ref 25.2–33.5)
MCHC RBC AUTO-ENTMCNC: 32.8 G/DL (ref 28.4–34.8)
MCV RBC AUTO: 91.6 FL (ref 82.6–102.9)
MONOCYTES NFR BLD: 0.74 K/UL (ref 0.1–1.2)
MONOCYTES NFR BLD: 10 % (ref 3–12)
NEUTROPHILS NFR BLD: 49 % (ref 36–65)
NEUTS SEG NFR BLD: 3.81 K/UL (ref 1.5–8.1)
NRBC BLD-RTO: 0 PER 100 WBC
PLATELET # BLD AUTO: 248 K/UL (ref 138–453)
PMV BLD AUTO: 10.5 FL (ref 8.1–13.5)
POTASSIUM SERPL-SCNC: 3.9 MMOL/L (ref 3.7–5.3)
RBC # BLD AUTO: 4.39 M/UL (ref 3.95–5.11)
SODIUM SERPL-SCNC: 139 MMOL/L (ref 135–144)
TROPONIN I SERPL HS-MCNC: 8 NG/L (ref 0–14)
WBC OTHER # BLD: 7.6 K/UL (ref 3.5–11.3)

## 2024-06-29 PROCEDURE — 93005 ELECTROCARDIOGRAM TRACING: CPT

## 2024-06-29 PROCEDURE — 85379 FIBRIN DEGRADATION QUANT: CPT

## 2024-06-29 PROCEDURE — 99285 EMERGENCY DEPT VISIT HI MDM: CPT

## 2024-06-29 PROCEDURE — 93010 ELECTROCARDIOGRAM REPORT: CPT | Performed by: INTERNAL MEDICINE

## 2024-06-29 PROCEDURE — 80048 BASIC METABOLIC PNL TOTAL CA: CPT

## 2024-06-29 PROCEDURE — 85025 COMPLETE CBC W/AUTO DIFF WBC: CPT

## 2024-06-29 PROCEDURE — 69209 REMOVE IMPACTED EAR WAX UNI: CPT

## 2024-06-29 PROCEDURE — 84484 ASSAY OF TROPONIN QUANT: CPT

## 2024-06-29 PROCEDURE — 71045 X-RAY EXAM CHEST 1 VIEW: CPT

## 2024-06-29 RX ORDER — VALSARTAN 80 MG/1
80 TABLET ORAL DAILY
COMMUNITY

## 2024-06-29 ASSESSMENT — ENCOUNTER SYMPTOMS
VOMITING: 0
RHINORRHEA: 0
SHORTNESS OF BREATH: 1
COUGH: 0
VOICE CHANGE: 0
SINUS PRESSURE: 0
CONSTIPATION: 0
NAUSEA: 0
WHEEZING: 0
SORE THROAT: 0
ABDOMINAL PAIN: 0
CHEST TIGHTNESS: 0
TROUBLE SWALLOWING: 0
SINUS PAIN: 0
DIARRHEA: 0

## 2024-06-29 ASSESSMENT — PAIN - FUNCTIONAL ASSESSMENT: PAIN_FUNCTIONAL_ASSESSMENT: NONE - DENIES PAIN

## 2024-06-29 NOTE — ED PROVIDER NOTES
[facility]  [address] [address]  Phone: [phone]        Pt Name: Shari Gordon  MRN: [id number]  Birthdate 1955  Date of evaluation: 6/29/24    CHIEFCOMPLAINT       Chief Complaint   Patient presents with    Shortness of Breath     Pt states SOB x 1 day. Pt states being anxious yesterday. Pt states tingling in bilateral arms and right leg.        HISTORY OF PRESENT ILLNESS (Location/Symptom, Timing/Onset, Context/Setting, Quality, Duration, Modifying Factors, Severity)      Shari Gordon is a 68 y.o. female with pertinent PMH of hypertension, Prater's esophagus, psoriasis, surgical history of cholecystectomy, tubal ligation and left hernia repair who presents to the ED via private auto with complaint of shortness of breath even while at rest that began yesterday.  Patient denies history of asthma or COPD, is a non-smoker.  Denies any recent cough or upper respiratory infections.  No sick contacts at home.  Denies any chest pain associated with this, nausea or sweating.  States that it is continuous, no alleviating or exacerbating factors.  Patient reports having a stress test in the past, states it was many years ago and does not currently follow with cardiology.  Patient also complains of fullness and decreased hearing to her right ear since going swimming the other day, states she has tried to be off brand Debrox drops without any success.  She denies pain to the ear, bleeding or drainage.  No sore throat or congestion.  Patient states she is feels anxious about the shortness of breath, at times will have numbness and tingling to her extremities with it.    PAST MEDICAL / SURGICAL / SOCIAL / FAMILY HISTORY     PMH:  has a past medical history of Acid reflux, History of acute pancreatitis, Hypertension, and Psoriasis.  Surgical History:  has a past surgical history that includes Cholecystectomy; Tubal ligation; Upper gastrointestinal endoscopy; Breast surgery;  0 - 14 ng/L   D-Dimer, Quantitative   Result Value Ref Range    D-Dimer, Quant 0.42 0.00 - 0.59 ug/mL FEU   EKG 12 Lead   Result Value Ref Range    Ventricular Rate 72 BPM    Atrial Rate 72 BPM    P-R Interval 204 ms    QRS Duration 86 ms    Q-T Interval 394 ms    QTc Calculation (Bazett) 431 ms    P Axis 25 degrees    R Axis -19 degrees    T Axis 45 degrees       EMERGENCY DEPARTMENT COURSE     ED Course as of 06/29/24 1120   Sat Jun 29, 2024   1117 D-Dimer, Quant: 0.42 [AJ]   1117 BMP(!):    Sodium 139   Potassium 3.9   Chloride 103   CARBON DIOXIDE 22   Anion Gap 14   Glucose 116(!)   BUN,BUNPL 14   Creatinine 0.7   Est, Glom Filt Rate >90   Bun/Cre 20   Calcium 9.3 [AJ]   1117 CBC with Auto Differential:    WBC 7.6   RBC 4.39   Hemoglobin Quant 13.2   Hematocrit 40.2   MCV 91.6   MCH 30.1   MCHC 32.8   RDW 12.5   Platelet Count 248   MPV 10.5   NRBC Automated 0.0   Neutrophils % 49   Lymphocyte % 35   Monocytes % 10   Eosinophils % 4   Basophils % 2   Immature Granulocytes % 0   Neutrophils Absolute 3.81   Lymphocytes Absolute 2.62   Monocytes Absolute 0.74   Eosinophils Absolute 0.28   Basophils Absolute 0.11   Immature Granulocytes Absolute 0.01 [AJ]   1117 Troponin:    Troponin, High Sensitivity 8 [AJ]   1117 XR CHEST PORTABLE  No leukocytosis on CBC, normal H&H, normal platelet count of 248.  Normal electrolytes and kidney function on BMP.  Negative D-dimer of 0.42.  Negative troponin of 8, very low suspicion of ACS.  Chest x-ray negative for acute findings.  No evidence of pneumonia, pneumothorax or cardiomegaly.  Patient will follow-up with her primary care physician.  We discussed possibly needing another stress test for further evaluation and to possibly determine the cause of her symptoms.  ER return precautions discussed, patient is comfortable with this plan and verbalized understanding. [AJ]      ED Course User Index  [AJ] Amalia Gupta, APRN - CNP        Vitals:    Vitals:    06/29/24 1019   BP:

## 2024-06-29 NOTE — DISCHARGE INSTRUCTIONS
Take your medication as indicated.  For pain use ibuprofen (Motrin / Advil) or acetaminophen (Tylenol), unless prescribed medications that have acetaminophen in it.  You can take over the counter acetaminophen tablets (1 - 2 tablets of the 500-mg strength every 6 hours) or ibuprofen tablets (2 tablets every 4 hours).    If you have not had a stress test in over a year your primary care physician may order this test as further work-up for your chest pain.  If you have a cardiologist, then you should also call them to discuss further treatment options.    Use Debrox drops over-the-counter, as directed to prevent further earwax impaction.  Avoid placing anything in your external ear canal including Q-tips.    PLEASE RETURN TO THE EMERGENCY DEPARTMENT IMMEDIATELY for worsening symptoms of increasing pain, shortness of breath, feeling of your heart fluttering or racing, swelling to your feet, unable to lay flat, or if you develop any concerning symptoms such as: high fever not relieved by acetaminophen (Tylenol) and/or ibuprofen (Motrin / Advil), chills, persistent nausea and/or vomiting, loss of consciousness, numbness, weakness or tingling in the arms or legs or change in color of the extremities, changes in mental status, persistent headache, blurry vision, loss of bladder / bowel control, unable to follow up with your physician, or other any other care or concern.

## 2024-06-29 NOTE — ED PROVIDER NOTES
eMERGENCY dEPARTMENT eNCOUnter   Independent Attestation     Pt Name: Shari Gordon  MRN: [id number]  Birthdate 1955  Date of evaluation: 6/29/24     Shari Gordon is a 68 y.o. female with CC: Shortness of Breath (Pt states SOB x 1 day. Pt states being anxious yesterday. Pt states tingling in bilateral arms and right leg. )      Based on the medical record the care appears appropriate.  I was personally available for consultation in the Emergency Department.    Chele Silverman MD  Attending Emergency Physician         Chele Silverman MD  06/29/24 1028

## 2024-06-29 NOTE — ED NOTES
Left and right ears irrigated. Large amount of buildup removed from right ear. Small amount from the left. Pt reports small improvement in hearing to right and great improvement to left.   Respiratory

## 2024-09-10 ENCOUNTER — HOSPITAL ENCOUNTER (EMERGENCY)
Age: 69
Discharge: HOME | End: 2024-09-10
Attending: EMERGENCY MEDICINE
Payer: MEDICARE

## 2024-09-10 VITALS
TEMPERATURE: 97.2 F | BODY MASS INDEX: 30.04 KG/M2 | OXYGEN SATURATION: 98 % | RESPIRATION RATE: 18 BRPM | WEIGHT: 175 LBS | DIASTOLIC BLOOD PRESSURE: 70 MMHG | SYSTOLIC BLOOD PRESSURE: 144 MMHG | HEART RATE: 55 BPM

## 2024-09-10 DIAGNOSIS — R20.2 TINGLING: Primary | ICD-10-CM

## 2024-09-10 LAB
ALBUMIN SERPL-MCNC: 4.3 G/DL (ref 3.5–5.2)
ALP SERPL-CCNC: 83 U/L (ref 35–104)
ALT SERPL-CCNC: 21 U/L (ref 5–33)
ANION GAP SERPL CALCULATED.3IONS-SCNC: 12 MMOL/L (ref 9–17)
AST SERPL-CCNC: 26 U/L
BILIRUB SERPL-MCNC: 0.6 MG/DL (ref 0.3–1.2)
BUN SERPL-MCNC: 17 MG/DL (ref 8–23)
BUN/CREAT SERPL: 28 (ref 9–20)
CALCIUM SERPL-MCNC: 9.6 MG/DL (ref 8.6–10.4)
CHLORIDE SERPL-SCNC: 103 MMOL/L (ref 98–107)
CO2 SERPL-SCNC: 26 MMOL/L (ref 20–31)
CREAT SERPL-MCNC: 0.6 MG/DL (ref 0.5–0.9)
GFR, ESTIMATED: >90 ML/MIN/1.73M2
GLUCOSE SERPL-MCNC: 103 MG/DL (ref 70–99)
MAGNESIUM SERPL-MCNC: 2.2 MG/DL (ref 1.6–2.6)
POTASSIUM SERPL-SCNC: 4 MMOL/L (ref 3.7–5.3)
PROT SERPL-MCNC: 7.4 G/DL (ref 6.4–8.3)
SODIUM SERPL-SCNC: 141 MMOL/L (ref 135–144)
TSH SERPL DL<=0.05 MIU/L-ACNC: 3.08 UIU/ML (ref 0.3–5)

## 2024-09-10 PROCEDURE — 93005 ELECTROCARDIOGRAM TRACING: CPT | Performed by: EMERGENCY MEDICINE

## 2024-09-10 PROCEDURE — 99284 EMERGENCY DEPT VISIT MOD MDM: CPT

## 2024-09-10 PROCEDURE — 84443 ASSAY THYROID STIM HORMONE: CPT

## 2024-09-10 PROCEDURE — 80053 COMPREHEN METABOLIC PANEL: CPT

## 2024-09-10 PROCEDURE — 83735 ASSAY OF MAGNESIUM: CPT

## 2024-09-10 ASSESSMENT — PAIN - FUNCTIONAL ASSESSMENT: PAIN_FUNCTIONAL_ASSESSMENT: NONE - DENIES PAIN

## 2024-09-12 LAB
EKG ATRIAL RATE: 65 BPM
EKG P AXIS: 14 DEGREES
EKG P-R INTERVAL: 176 MS
EKG Q-T INTERVAL: 414 MS
EKG QRS DURATION: 84 MS
EKG QTC CALCULATION (BAZETT): 430 MS
EKG R AXIS: -11 DEGREES
EKG T AXIS: 28 DEGREES
EKG VENTRICULAR RATE: 65 BPM

## (undated) DEVICE — GLOVE SURG SZ 75 CRM LTX FREE POLYISOPRENE POLYMER BEAD ANTI

## (undated) DEVICE — TOTAL TRAY, DB, 100% SILI FOLEY, 16FR 10: Brand: MEDLINE

## (undated) DEVICE — TUBING, SUCTION, 1/4" X 12', STRAIGHT: Brand: MEDLINE

## (undated) DEVICE — YANKAUER,FLEXIBLE HANDLE,REGLR CAPACITY: Brand: MEDLINE INDUSTRIES, INC.

## (undated) DEVICE — SUTURE PDS II SZ 0 L27IN ABSRB VLT UR-6 L26MM 1/2 CIR D7185

## (undated) DEVICE — LAPAROSCOPIC SCISSORS: Brand: EPIX LAPAROSCOPIC SCISSORS

## (undated) DEVICE — TROCAR: Brand: KII FIOS FIRST ENTRY

## (undated) DEVICE — TIP COVER ACCESSORY

## (undated) DEVICE — SWABSTICK MEDICATED 10% POVIDONE IOD PVP SGL ANTISEP SAT

## (undated) DEVICE — POSITIONER HD W8XH4XL8.5IN RASPBERRY FOAM SLT

## (undated) DEVICE — APPLICATOR MEDICATED 26 CC SOLUTION HI LT ORNG CHLORAPREP

## (undated) DEVICE — SUTURE V-LOC 180 SZ 0 L12IN ABSRB GRN L37MM GS-21 1/2 CIR VLOCL0316

## (undated) DEVICE — ST. ANNE'S MULTI PORT PACK: Brand: MEDLINE INDUSTRIES, INC.

## (undated) DEVICE — DRESSING TRNSPAR W2XL2.75IN FLM SHT SEMIPERMEABLE WIND

## (undated) DEVICE — SUTURE MCRYL SZ 4-0 L27IN ABSRB UD L19MM PS-2 1/2 CIR PRIM Y426H

## (undated) DEVICE — GARMENT,MEDLINE,DVT,INT,CALF,MED, GEN2: Brand: MEDLINE

## (undated) DEVICE — CANNULA SEAL

## (undated) DEVICE — DUAL LUMEN STOMACH TUBE: Brand: SALEM SUMP

## (undated) DEVICE — ADHESIVE SKIN CLSR 0.7ML TOP DERMBND ADV

## (undated) DEVICE — BLADELESS OBTURATOR, LONG: Brand: WECK VISTA

## (undated) DEVICE — SUTURE VCRL SZ 3-0 L27IN ABSRB UD L26MM SH 1/2 CIR J416H

## (undated) DEVICE — ELECTRO LUBE IS A SINGLE PATIENT USE DEVICE THAT IS INTENDED TO BE USED ON ELECTROSURGICAL ELECTRODES TO REDUCE STICKING.: Brand: KEY SURGICAL ELECTRO LUBE

## (undated) DEVICE — SUTURE SZ 0 27IN 5/8 CIR UR-6  TAPER PT VIOLET ABSRB VICRYL J603H

## (undated) DEVICE — BLANKET WRM W29.9XL79.1IN UP BODY FORC AIR MISTRAL-AIR

## (undated) DEVICE — ARM DRAPE

## (undated) DEVICE — TROCARS: Brand: KII® BALLOON BLUNT TIP SYSTEM

## (undated) DEVICE — SUTURE V-LOC SZ 0 L18IN NONABSORBABLE BLU L37MM GS-21 1/2 VLOCN0326